# Patient Record
Sex: MALE | Race: WHITE | HISPANIC OR LATINO | Employment: FULL TIME | URBAN - METROPOLITAN AREA
[De-identification: names, ages, dates, MRNs, and addresses within clinical notes are randomized per-mention and may not be internally consistent; named-entity substitution may affect disease eponyms.]

---

## 2018-08-03 ENCOUNTER — OFFICE VISIT (OUTPATIENT)
Dept: FAMILY MEDICINE CLINIC | Facility: CLINIC | Age: 53
End: 2018-08-03
Payer: COMMERCIAL

## 2018-08-03 VITALS — WEIGHT: 145 LBS | HEART RATE: 82 BPM | RESPIRATION RATE: 16 BRPM | TEMPERATURE: 97.5 F | OXYGEN SATURATION: 97 %

## 2018-08-03 DIAGNOSIS — R21 RASH AND NONSPECIFIC SKIN ERUPTION: Primary | ICD-10-CM

## 2018-08-03 PROCEDURE — 99213 OFFICE O/P EST LOW 20 MIN: CPT | Performed by: FAMILY MEDICINE

## 2018-08-03 RX ORDER — GABAPENTIN 600 MG/1
600 TABLET ORAL 3 TIMES DAILY
Refills: 0 | COMMUNITY
Start: 2018-05-17 | End: 2022-05-23 | Stop reason: ALTCHOICE

## 2018-08-03 RX ORDER — CYCLOBENZAPRINE HCL 10 MG
10 TABLET ORAL
Refills: 0 | COMMUNITY
Start: 2018-05-17 | End: 2018-09-06

## 2018-08-03 RX ORDER — SULFAMETHOXAZOLE AND TRIMETHOPRIM 800; 160 MG/1; MG/1
1 TABLET ORAL EVERY 12 HOURS SCHEDULED
Qty: 20 TABLET | Refills: 0 | Status: SHIPPED | OUTPATIENT
Start: 2018-08-03 | End: 2018-08-13

## 2018-08-03 NOTE — PROGRESS NOTES
Assessment/Plan:    Rash and nonspecific skin eruption  Could be staph infection but cannot rule out fungal this time as under KOH  microscope be did not appreciate any hyphae  Differential includes psoriasis  prescribed Bactrim for 10 days high dose and reassess for any resolution in 2-3  Weeks  Did not prescribe steroids as I did not want to mask diagnosis  If symptoms do not resolve with antibiotics will consider sending patient for Dermatology evaluation with biopsy  Diagnoses and all orders for this visit:    Rash and nonspecific skin eruption  -     sulfamethoxazole-trimethoprim (BACTRIM DS) 800-160 mg per tablet; Take 1 tablet by mouth every 12 (twelve) hours for 10 days    Other orders  -     cyclobenzaprine (FLEXERIL) 10 mg tablet; Take 10 mg by mouth daily at bedtime  -     gabapentin (NEURONTIN) 600 MG tablet; Take 600 mg by mouth 3 (three) times a day          Subjective:      Patient ID: Denisha Gonzalez is a 47 y o  male  81-year-old male significant past medical history of of chronic rash this always in the same area of the neck and groin  Usually occurs in the summertime with his exposure to sun and sweats  Lesion is normally starts blisters and then scab over or the come dry  Lesions of the neck there associated with some pain, in the groin associated with itching  Was prescribed topical steroids in the past and relieves the symptoms as well as of prednisone  No systemic symptoms, was referred to dermatologist in the past but unsure if there was miscommunication but patient did not see a dermatologist because he never got a phone call  Rash   This is a chronic problem  Episode onset: More than 10 years  The problem has been waxing and waning since onset  The affected locations include the neck and groin (A within the posterior aspect of his neck and shoulder, and groin area)   The rash is characterized by itchiness (In the groin H is and on the shoulder neck area there reports some pain)  Associated with: Ced Maureen exposure making worse or sweating  Pertinent negatives include no anorexia, congestion, cough, diarrhea, eye pain, facial edema, fatigue, fever, joint pain, nail changes, rhinorrhea, shortness of breath, sore throat or vomiting  Past treatments include topical steroids and oral steroids  The treatment provided moderate relief  There is no history of allergies, asthma, eczema or varicella  The following portions of the patient's history were reviewed and updated as appropriate: He  has no past medical history on file  Patient Active Problem List    Diagnosis Date Noted    Rash and nonspecific skin eruption 08/03/2018     He  has no past surgical history on file  His family history is not on file  He  reports that he has never smoked  He has never used smokeless tobacco  He reports that he does not drink alcohol or use drugs  Current Outpatient Prescriptions   Medication Sig Dispense Refill    cyclobenzaprine (FLEXERIL) 10 mg tablet Take 10 mg by mouth daily at bedtime  0    gabapentin (NEURONTIN) 600 MG tablet Take 600 mg by mouth 3 (three) times a day  0    sulfamethoxazole-trimethoprim (BACTRIM DS) 800-160 mg per tablet Take 1 tablet by mouth every 12 (twelve) hours for 10 days 20 tablet 0     No current facility-administered medications for this visit  No current outpatient prescriptions on file prior to visit  No current facility-administered medications on file prior to visit  He has No Known Allergies       Review of Systems   Constitutional: Negative for activity change, appetite change, chills, diaphoresis, fatigue, fever and unexpected weight change  HENT: Negative for congestion, ear pain, facial swelling, hearing loss, nosebleeds, postnasal drip, rhinorrhea, sneezing, sore throat and trouble swallowing  Eyes: Negative for pain and redness  Respiratory: Negative for apnea, cough, choking, chest tightness and shortness of breath  Cardiovascular: Negative for chest pain, palpitations and leg swelling  Gastrointestinal: Negative for abdominal distention, abdominal pain, anorexia, blood in stool, constipation, diarrhea, nausea and vomiting  Endocrine: Negative for polydipsia, polyphagia and polyuria  Musculoskeletal: Negative for joint pain  Skin: Positive for rash  Negative for nail changes, pallor and wound  Hematological:        As described as in HPI initially starts blistering and then continues to be scabs over         Objective:      Pulse 82   Temp 97 5 °F (36 4 °C) (Temporal)   Resp 16   Wt 65 8 kg (145 lb)   SpO2 97%          Physical Exam   Constitutional: He is oriented to person, place, and time  He appears well-developed and well-nourished  No distress  HENT:   Head: Normocephalic and atraumatic  Mouth/Throat: No oropharyngeal exudate  Eyes: Conjunctivae and EOM are normal  Pupils are equal, round, and reactive to light  Right eye exhibits no discharge  Left eye exhibits no discharge  Neck: Normal range of motion  Neck supple  Cardiovascular: Normal rate, regular rhythm and normal heart sounds  Exam reveals no gallop and no friction rub  Pulmonary/Chest: Effort normal and breath sounds normal  No respiratory distress  He has no wheezes  He has no rales  He exhibits no tenderness  Abdominal: Soft  Bowel sounds are normal  He exhibits no distension  There is no tenderness  There is no rebound and no guarding  Musculoskeletal: Normal range of motion  He exhibits no edema, tenderness or deformity  Lymphadenopathy:     He has no cervical adenopathy  Neurological: He is alert and oriented to person, place, and time  No cranial nerve deficit  Skin: Skin is warm  Rash noted  He is not diaphoretic  No erythema  Psychiatric: He has a normal mood and affect   His behavior is normal  Thought content normal

## 2018-08-03 NOTE — ASSESSMENT & PLAN NOTE
Could be staph infection but cannot rule out fungal this time as under KOH  microscope be did not appreciate any hyphae  Differential includes psoriasis  prescribed Bactrim for 10 days high dose and reassess for any resolution in 2-3  Weeks  Did not prescribe steroids as I did not want to mask diagnosis  If symptoms do not resolve with antibiotics will consider sending patient for Dermatology evaluation with biopsy

## 2018-08-17 ENCOUNTER — APPOINTMENT (OUTPATIENT)
Dept: LAB | Facility: HOSPITAL | Age: 53
End: 2018-08-17
Payer: COMMERCIAL

## 2018-08-17 ENCOUNTER — OFFICE VISIT (OUTPATIENT)
Dept: FAMILY MEDICINE CLINIC | Facility: CLINIC | Age: 53
End: 2018-08-17
Payer: COMMERCIAL

## 2018-08-17 VITALS
RESPIRATION RATE: 16 BRPM | HEART RATE: 86 BPM | BODY MASS INDEX: 24.59 KG/M2 | WEIGHT: 144 LBS | TEMPERATURE: 97 F | OXYGEN SATURATION: 98 % | SYSTOLIC BLOOD PRESSURE: 110 MMHG | HEIGHT: 64 IN | DIASTOLIC BLOOD PRESSURE: 70 MMHG

## 2018-08-17 DIAGNOSIS — R31.0 HEMATURIA, GROSS: ICD-10-CM

## 2018-08-17 DIAGNOSIS — R31.0 HEMATURIA, GROSS: Primary | ICD-10-CM

## 2018-08-17 LAB
ALBUMIN SERPL BCP-MCNC: 4.1 G/DL (ref 3–5.2)
ALP SERPL-CCNC: 101 U/L (ref 43–122)
ALT SERPL W P-5'-P-CCNC: 32 U/L (ref 9–52)
ANION GAP SERPL CALCULATED.3IONS-SCNC: 6 MMOL/L (ref 5–14)
AST SERPL W P-5'-P-CCNC: 37 U/L (ref 17–59)
BACTERIA UR QL AUTO: ABNORMAL /HPF
BASOPHILS # BLD AUTO: 0 THOUSANDS/ΜL (ref 0–0.1)
BASOPHILS NFR BLD AUTO: 0 % (ref 0–1)
BILIRUB SERPL-MCNC: 0.3 MG/DL
BILIRUB UR QL STRIP: NEGATIVE
BUN SERPL-MCNC: 14 MG/DL (ref 5–25)
CALCIUM SERPL-MCNC: 9 MG/DL (ref 8.4–10.2)
CHLORIDE SERPL-SCNC: 103 MMOL/L (ref 97–108)
CLARITY UR: CLEAR
CO2 SERPL-SCNC: 31 MMOL/L (ref 22–30)
COLOR UR: YELLOW
CREAT SERPL-MCNC: 1.44 MG/DL (ref 0.7–1.5)
EOSINOPHIL # BLD AUTO: 0.1 THOUSAND/ΜL (ref 0–0.4)
EOSINOPHIL NFR BLD AUTO: 1 % (ref 0–6)
ERYTHROCYTE [DISTWIDTH] IN BLOOD BY AUTOMATED COUNT: 14.4 %
GFR SERPL CREATININE-BSD FRML MDRD: 56 ML/MIN/1.73SQ M
GLUCOSE SERPL-MCNC: 87 MG/DL (ref 70–99)
GLUCOSE UR STRIP-MCNC: NEGATIVE MG/DL
HCT VFR BLD AUTO: 44.3 % (ref 41–53)
HGB BLD-MCNC: 15.1 G/DL (ref 13.5–17.5)
HGB UR QL STRIP.AUTO: 10
KETONES UR STRIP-MCNC: NEGATIVE MG/DL
LEUKOCYTE ESTERASE UR QL STRIP: 25
LYMPHOCYTES # BLD AUTO: 2.9 THOUSANDS/ΜL (ref 0.5–4)
LYMPHOCYTES NFR BLD AUTO: 31 % (ref 20–50)
MCH RBC QN AUTO: 29.6 PG (ref 26–34)
MCHC RBC AUTO-ENTMCNC: 34 G/DL (ref 31–36)
MCV RBC AUTO: 87 FL (ref 80–100)
MONOCYTES # BLD AUTO: 0.8 THOUSAND/ΜL (ref 0.2–0.9)
MONOCYTES NFR BLD AUTO: 8 % (ref 1–10)
MUCOUS THREADS UR QL AUTO: ABNORMAL
NEUTROPHILS # BLD AUTO: 5.6 THOUSANDS/ΜL (ref 1.8–7.8)
NEUTS SEG NFR BLD AUTO: 59 % (ref 45–65)
NITRITE UR QL STRIP: NEGATIVE
NON-SQ EPI CELLS URNS QL MICRO: ABNORMAL /HPF
PH UR STRIP.AUTO: 5 [PH] (ref 4.5–8)
PLATELET # BLD AUTO: 184 THOUSANDS/UL (ref 150–450)
PMV BLD AUTO: 10 FL (ref 8.9–12.7)
POTASSIUM SERPL-SCNC: 4.1 MMOL/L (ref 3.6–5)
PROT SERPL-MCNC: 7.5 G/DL (ref 5.9–8.4)
PROT UR STRIP-MCNC: NEGATIVE MG/DL
RBC # BLD AUTO: 5.09 MILLION/UL (ref 4.5–5.9)
RBC #/AREA URNS AUTO: ABNORMAL /HPF
SL AMB  POCT GLUCOSE, UA: NORMAL
SL AMB LEUKOCYTE ESTERASE,UA: NEGATIVE
SL AMB POCT BILIRUBIN,UA: NEGATIVE
SL AMB POCT BLOOD,UA: NORMAL
SL AMB POCT CLARITY,UA: CLEAR
SL AMB POCT COLOR,UA: YELLOW
SL AMB POCT KETONES,UA: NEGATIVE
SL AMB POCT NITRITE,UA: NEGATIVE
SL AMB POCT PH,UA: 5
SL AMB POCT SPECIFIC GRAVITY,UA: 1.03
SL AMB POCT URINE PROTEIN: NORMAL
SL AMB POCT UROBILINOGEN: NORMAL
SODIUM SERPL-SCNC: 140 MMOL/L (ref 137–147)
SP GR UR STRIP.AUTO: 1.02 (ref 1–1.04)
UROBILINOGEN UA: NEGATIVE MG/DL
WBC # BLD AUTO: 9.4 THOUSAND/UL (ref 4.5–11)
WBC #/AREA URNS AUTO: ABNORMAL /HPF

## 2018-08-17 PROCEDURE — 80053 COMPREHEN METABOLIC PANEL: CPT

## 2018-08-17 PROCEDURE — 81001 URINALYSIS AUTO W/SCOPE: CPT | Performed by: STUDENT IN AN ORGANIZED HEALTH CARE EDUCATION/TRAINING PROGRAM

## 2018-08-17 PROCEDURE — 81002 URINALYSIS NONAUTO W/O SCOPE: CPT | Performed by: FAMILY MEDICINE

## 2018-08-17 PROCEDURE — 82270 OCCULT BLOOD FECES: CPT | Performed by: FAMILY MEDICINE

## 2018-08-17 PROCEDURE — 81003 URINALYSIS AUTO W/O SCOPE: CPT | Performed by: STUDENT IN AN ORGANIZED HEALTH CARE EDUCATION/TRAINING PROGRAM

## 2018-08-17 PROCEDURE — 85025 COMPLETE CBC W/AUTO DIFF WBC: CPT

## 2018-08-17 PROCEDURE — 36415 COLL VENOUS BLD VENIPUNCTURE: CPT

## 2018-08-17 PROCEDURE — 99214 OFFICE O/P EST MOD 30 MIN: CPT | Performed by: FAMILY MEDICINE

## 2018-08-17 RX ORDER — MELOXICAM 15 MG/1
15 TABLET ORAL DAILY
COMMUNITY
End: 2018-08-17 | Stop reason: SINTOL

## 2018-08-17 NOTE — PROGRESS NOTES
Assessment/Plan:    Hematuria, gross  Unknown etiology could be cystitis versus stone versus urethral injury versus bladder injury versus malignancy versus prostate disease  No alarming symptoms no night sweats no weight changes  Vitals stable, no systemic symptoms, no lightheadedness no chest pain or shortness of breath no abdominal pain or testicular pain  Two week of penile bleeding, 2nd episode, last episode 3 years ago  No history of trauma, no trauma during intercourse, patient denied any foreign objects insertion  On physical exam there is gross blood from the penis  Urine dipstick failed to show any sign infection  Had similar episode through more than 3 years ago had cystoscopy which was negative Per patient and floor  Will put in a Urology consult stat  Most likely will be seen by Urology Monday or Tuesday per [facility]   CBC and CMP stat  Urinalysis with reflex culture  Instructed patient on alarming symptoms and when to report to the emergency room patient preverbalized his  Understanding  Avoid NSAIDs and including current medication mobic   Discontinue Mobic     Rash and nonspecific skin eruption  Resolved after Bactrim       Diagnoses and all orders for this visit:    Hematuria, gross  -     CBC and differential; Future  -     Comprehensive metabolic panel; Future  -     UA w Reflex to Microscopic w Reflex to Culture  -     Ambulatory referral to Urogynecology; Future  -     POCT urine dip  -     POCT hemoccult screening  -     Urine Microscopic    Other orders  -     Discontinue: meloxicam (MOBIC) 15 mg tablet; Take 15 mg by mouth daily          Subjective:      Patient ID: Monico Murphy is a 47 y o  male  26-year-old male with no significant past medical history presents to office today with 2 weeks of penile bleeding  Patient reports seeing red spots on his underwear after taking a shower at night  Daily for 2 weeks    No other associated symptom, painless no dysuria no urgency or hesitancy no pain on defecation  No lymph node denied up with the noted by patient  Patient denies any penile discharge  Patient reports had similar systems more than 3 years ago in [place] where he was checked by urologist and did cystoscopy  Patient reports urologist that he had BPH and received no treatment  Denies any fevers chills nausea vomiting diarrhea shortness breath chest pain lightheadedness abdominal pain        The following portions of the patient's history were reviewed and updated as appropriate: He  has no past medical history on file  Patient Active Problem List    Diagnosis Date Noted    Hematuria, gross 08/17/2018    Rash and nonspecific skin eruption 08/03/2018     He  has no past surgical history on file  His family history is not on file  He  reports that he has never smoked  He has never used smokeless tobacco  He reports that he does not drink alcohol or use drugs  Current Outpatient Prescriptions   Medication Sig Dispense Refill    cyclobenzaprine (FLEXERIL) 10 mg tablet Take 10 mg by mouth daily at bedtime  0    gabapentin (NEURONTIN) 600 MG tablet Take 600 mg by mouth 3 (three) times a day  0     No current facility-administered medications for this visit  Current Outpatient Prescriptions on File Prior to Visit   Medication Sig    cyclobenzaprine (FLEXERIL) 10 mg tablet Take 10 mg by mouth daily at bedtime    gabapentin (NEURONTIN) 600 MG tablet Take 600 mg by mouth 3 (three) times a day     No current facility-administered medications on file prior to visit  He has No Known Allergies       Review of Systems   Constitutional: Negative for appetite change, chills, diaphoresis, fatigue and fever  HENT: Negative for congestion, sinus pain, sore throat and tinnitus  Eyes: Negative for photophobia, pain, redness and itching  Respiratory: Negative for apnea, choking and chest tightness      Cardiovascular: Negative for chest pain, palpitations and leg swelling  Gastrointestinal: Negative for abdominal distention, abdominal pain, anal bleeding, blood in stool, diarrhea, nausea and rectal pain  Endocrine: Negative for cold intolerance and heat intolerance  Genitourinary: Negative for decreased urine volume, difficulty urinating, discharge, dysuria, enuresis, flank pain, frequency, genital sores, hematuria, penile pain, penile swelling, scrotal swelling, testicular pain and urgency  Gross blood from the penis orphis   Note no tenderness, painless, no lesions, no discharge     Musculoskeletal: Negative for arthralgias, back pain, gait problem, joint swelling and myalgias  Skin: Negative for color change, pallor, rash and wound  Neurological: Negative for dizziness, tremors, seizures, syncope, weakness, light-headedness and numbness  Hematological: Negative for adenopathy  Does not bruise/bleed easily  Psychiatric/Behavioral: Negative for decreased concentration and dysphoric mood  Objective:      /70 (BP Location: Left arm, Patient Position: Sitting, Cuff Size: Adult)   Pulse 86   Temp (!) 97 °F (36 1 °C) (Tympanic)   Resp 16   Ht 5' 4" (1 626 m)   Wt 65 3 kg (144 lb)   SpO2 98%   BMI 24 72 kg/m²          Physical Exam   Constitutional: He is oriented to person, place, and time  HENT:   Head: Normocephalic  Eyes: Right eye exhibits no discharge  Left eye exhibits no discharge  Neck: No JVD present  No tracheal deviation present  No thyromegaly present  Cardiovascular: Normal rate, regular rhythm, normal heart sounds and intact distal pulses  Exam reveals no friction rub  No murmur heard  Pulmonary/Chest: No stridor  No respiratory distress  He has no wheezes  He has no rales  Abdominal: Soft  Bowel sounds are normal  He exhibits no distension and no mass  There is no tenderness  There is no rebound and no guarding  Genitourinary: Rectum normal and prostate normal  Rectal exam shows guaiac negative stool   No penile tenderness  Genitourinary Comments: Gross blood from the orifice of the penis on examination  Painless, no tenderness no erythema no rashes no lesions no adenopathy   Musculoskeletal: Normal range of motion  He exhibits no edema, tenderness or deformity  Lymphadenopathy:     He has no cervical adenopathy  Neurological: He is alert and oriented to person, place, and time  He has normal reflexes  No cranial nerve deficit  Coordination normal    Skin: Skin is warm and dry  No rash noted  No erythema  No pallor  Psychiatric: He has a normal mood and affect   His behavior is normal  Judgment and thought content normal

## 2018-08-17 NOTE — ASSESSMENT & PLAN NOTE
Unknown etiology could be cystitis versus stone versus urethral injury versus bladder injury versus malignancy versus prostate disease  No alarming symptoms no night sweats no weight changes  Vitals stable, no systemic symptoms, no lightheadedness no chest pain or shortness of breath no abdominal pain or testicular pain  Two week of penile bleeding, 2nd episode, last episode 3 years ago  No history of trauma, no trauma during intercourse, patient denied any foreign objects insertion  On physical exam there is gross blood from the penis  Urine dipstick failed to show any sign infection  Had similar episode through more than 3 years ago had cystoscopy which was negative Per patient and floor  Will put in a Urology consult stat    Most likely will be seen by Urology Monday or Tuesday per [facility]   CBC and CMP stat  Urinalysis with reflex culture  Instructed patient on alarming symptoms and when to report to the emergency room patient preverbalized his  Understanding  Avoid NSAIDs and including current medication mobic   Discontinue Mobic

## 2018-08-19 ENCOUNTER — TELEPHONE (OUTPATIENT)
Dept: FAMILY MEDICINE CLINIC | Facility: CLINIC | Age: 53
End: 2018-08-19

## 2018-08-19 NOTE — TELEPHONE ENCOUNTER
CALLED LABORATORY to see if there is reflex culture preliminary  The urine did not Reflex to culture as has not meet criteria protocol microscopic UA WBC not greater than 10  Called patient to check up on him, I discussed with wife and patient still the same no new changes, patient is still bleeding the same amount  Discussed with him urine blood result  Patient was unable to get an appointment with me or Dr Karina Lopez for next week  Patient has not her back on call for an appointment for urology referral, I encourage her to call the urology office, our office, and the scheduling Center to obtain a urology appointment  Discussed alarming symptoms and when to report to the emergency room or come back to our office

## 2018-08-25 DIAGNOSIS — R31.0 GROSS HEMATURIA: Primary | ICD-10-CM

## 2018-08-26 NOTE — PROGRESS NOTES
In speaking with patient's wife, she told me that her  received the wrong referral, urogynecology instead of urology  So I ordered STAT urology referral   Patient already has their phone number

## 2018-08-31 ENCOUNTER — OFFICE VISIT (OUTPATIENT)
Dept: UROLOGY | Facility: MEDICAL CENTER | Age: 53
End: 2018-08-31
Payer: COMMERCIAL

## 2018-08-31 VITALS
DIASTOLIC BLOOD PRESSURE: 60 MMHG | HEIGHT: 64 IN | WEIGHT: 146 LBS | BODY MASS INDEX: 24.92 KG/M2 | SYSTOLIC BLOOD PRESSURE: 124 MMHG

## 2018-08-31 DIAGNOSIS — R31.0 HEMATURIA, GROSS: Primary | ICD-10-CM

## 2018-08-31 DIAGNOSIS — N36.8 URETHRAL BLEEDING: ICD-10-CM

## 2018-08-31 DIAGNOSIS — N99.110 POSTPROCEDURAL MALE URETHRAL MEATAL STRICTURE: ICD-10-CM

## 2018-08-31 LAB
SL AMB  POCT GLUCOSE, UA: ABNORMAL
SL AMB LEUKOCYTE ESTERASE,UA: ABNORMAL
SL AMB POCT BILIRUBIN,UA: ABNORMAL
SL AMB POCT BLOOD,UA: ABNORMAL
SL AMB POCT CLARITY,UA: CLEAR
SL AMB POCT COLOR,UA: YELLOW
SL AMB POCT KETONES,UA: ABNORMAL
SL AMB POCT NITRITE,UA: ABNORMAL
SL AMB POCT PH,UA: 5.5
SL AMB POCT SPECIFIC GRAVITY,UA: 1.02
SL AMB POCT URINE PROTEIN: ABNORMAL
SL AMB POCT UROBILINOGEN: 1

## 2018-08-31 PROCEDURE — 81003 URINALYSIS AUTO W/O SCOPE: CPT | Performed by: UROLOGY

## 2018-08-31 PROCEDURE — 88112 CYTOPATH CELL ENHANCE TECH: CPT | Performed by: PATHOLOGY

## 2018-08-31 PROCEDURE — 87086 URINE CULTURE/COLONY COUNT: CPT | Performed by: UROLOGY

## 2018-08-31 PROCEDURE — 99244 OFF/OP CNSLTJ NEW/EST MOD 40: CPT | Performed by: UROLOGY

## 2018-08-31 RX ORDER — CIPROFLOXACIN 500 MG/1
500 TABLET, FILM COATED ORAL EVERY 12 HOURS SCHEDULED
Qty: 14 TABLET | Refills: 0 | Status: SHIPPED | OUTPATIENT
Start: 2018-08-31 | End: 2018-09-07

## 2018-08-31 NOTE — LETTER
August 31, 2018     Will MD [address]  [address] [employer]    Patient: Denisha Gonzalez   YOB: 1965   Date of Visit: 8/31/2018       Dear Dr Mirlande Sky: Thank you for referring Denisha Gonzalez to me for evaluation  Below are my notes for this consultation  If you have questions, please do not hesitate to call me  I look forward to following your patient along with you  Sincerely,        Tanna Cockayne, MD        CC: No Recipients  Tanna Cockayne, MD  8/31/2018  3:15 PM  Sign at close encounter  Assessment/Plan:      Diagnoses and all orders for this visit:    Hematuria, gross  -     POCT urine dip auto non-scope  -     Cytology, urine; Future    Postprocedural male urethral meatal stricture          Subjective:  Urethral bleeding     Patient ID: Denisha Gonzalez is a 47 y o  male  HPI  Patient's urologic history dates back to 2007 he states that he had some urethral bleeding which workup involved a cystoscopy and possible urethral dilation  Based on he and his wife cyst description he may have had a urethral stricture that was dilated at that time  He did fine since then until about 3 weeks ago very has had a recurrence of urethral bleeding and gross hematuria  He denies any dysuria, flank pains history of nephrolithiasis or UTIs  He states that he voids well without any obstructive symptoms, hesitancy, or the need to bear down or strain to generate his urinary stream     Review of Systems   Constitutional: Negative  HENT: Negative  Eyes: Negative  Respiratory: Negative  Cardiovascular: Negative  Gastrointestinal: Negative  Endocrine: Negative  Genitourinary: Positive for hematuria  Negative for dysuria, flank pain and penile pain  Musculoskeletal: Negative  Skin: Negative  Allergic/Immunologic: Negative  Neurological: Negative  Hematological: Negative  Psychiatric/Behavioral: Negative            Objective:     Physical Exam Constitutional: He is oriented to person, place, and time  He appears well-developed and well-nourished  No distress  HENT:   Head: Normocephalic and atraumatic  Nose: Nose normal    Mouth/Throat: Oropharynx is clear and moist    Eyes: Conjunctivae are normal  No scleral icterus  Neck: Normal range of motion  Neck supple  Pulmonary/Chest: Effort normal  No respiratory distress  Abdominal: Soft  Bowel sounds are normal  He exhibits no distension and no mass  There is no tenderness  Genitourinary: Circumcised  Penile erythema present  Genitourinary Comments: There was blood present at the urethral meatus, which also had a slightly stenotic appearance  Musculoskeletal: Normal range of motion  He exhibits no edema or tenderness  Neurological: He is alert and oriented to person, place, and time  No cranial nerve deficit  Skin: Skin is warm and dry  No rash noted  No erythema  No pallor  Psychiatric: He has a normal mood and affect  His behavior is normal  Judgment and thought content normal    Nursing note and vitals reviewed

## 2018-08-31 NOTE — H&P
H&P Exam - Urology   Tatianna Tay 47 y o  male MRN: [id number]    Assessment/Plan     Assessment:  Urethral meatal stricture  Gross hematuria    Plan:  Cystoscopy with urethral meatal dilation, possible laser incision of urethral stricture, bilateral retrograde pyelograms    History of Present Illness   HPI:  Tatianna Tay is a 47 y o  male who presents with a urologic history dates back to 2007 he states that he had some urethral bleeding which workup involved a cystoscopy and possible urethral dilation  Based on he and his wife cyst description he may have had a urethral stricture that was dilated at that time  He did fine since then until about 3 weeks ago very has had a recurrence of urethral bleeding and gross hematuria  He denies any dysuria, flank pains history of nephrolithiasis or UTIs  He states that he voids pretty well without any significant obstructive symptoms, hesitancy, or the need to bear down or strain to generate his urinary stream     Review of Systems   Constitutional: Negative  HENT: Negative  Eyes: Negative  Respiratory: Negative  Cardiovascular: Negative  Gastrointestinal: Negative  Endocrine: Negative  Genitourinary: Positive for hematuria  Negative for dysuria, frequency and penile pain  Musculoskeletal: Negative  Skin: Negative  Allergic/Immunologic: Negative  Neurological: Negative  Hematological: Negative  Psychiatric/Behavioral: Negative  Historical Information   Past Medical History:   Diagnosis Date    Lumbar herniated disc      History reviewed  No pertinent surgical history    Social History   History   Alcohol Use No     History   Drug Use No     History   Smoking Status    Never Smoker   Smokeless Tobacco    Never Used     Family History:   Family History   Problem Relation Age of Onset    Cancer Mother     Cancer Father        Meds/Allergies   all medications and allergies reviewed  No Known Allergies    Objective   Vitals: Blood pressure 124/60, height 5' 4" (1 626 m), weight 66 2 kg (146 lb)  Physical Exam   Constitutional: He is oriented to person, place, and time  He appears well-developed and well-nourished  No distress  HENT:   Head: Normocephalic and atraumatic  Nose: Nose normal    Mouth/Throat: Oropharynx is clear and moist    Eyes: Conjunctivae and EOM are normal  Pupils are equal, round, and reactive to light  No scleral icterus  Neck: Normal range of motion  Neck supple  Cardiovascular: Normal rate, regular rhythm, normal heart sounds and intact distal pulses  No murmur heard  Pulmonary/Chest: Effort normal and breath sounds normal  No respiratory distress  He has no wheezes  He has no rales  Abdominal: Soft  Bowel sounds are normal  He exhibits no distension and no mass  There is no tenderness  Genitourinary:   Genitourinary Comments: Gross blood per urethral meatus, with stenosis   Musculoskeletal: Normal range of motion  He exhibits no edema or tenderness  Lymphadenopathy:     He has no cervical adenopathy  Neurological: He is alert and oriented to person, place, and time  No cranial nerve deficit  Skin: Skin is warm and dry  No rash noted  No erythema  No pallor  Psychiatric: He has a normal mood and affect  His behavior is normal  Judgment and thought content normal    Nursing note and vitals reviewed  Lab Results: I have personally reviewed pertinent reports  CBC: No results found for: WBC, HGB, HCT, MCV, PLT, ADJUSTEDWBC, MCH, MCHC, RDW, MPV, NRBC  CMP: No results found for: NA, CL, CO2, ANIONGAP, BUN, CREATININE, GLUCOSE, CALCIUM, AST, ALT, ALKPHOS, PROT, ALBUMIN, BILITOT, EGFR  Urinalysis: No results found for: Cherelle Penning, SPECGRAV, PHUR, LEUKOCYTESUR, NITRITE, PROTEINUA, GLUCOSEU, KETONESU, BILIRUBINUR, BLOODU  Imaging: I have personally reviewed pertinent reports     and I have personally reviewed pertinent films in PACS  EKG, Pathology, and Other Studies: I have personally reviewed pertinent reports  and I have personally reviewed pertinent films in PACS    Counseling / Coordination of Care  Total floor / unit time spent today 40 minutes  Greater than 50% of total time was spent with the patient and / or family counseling and / or coordination of care

## 2018-08-31 NOTE — PROGRESS NOTES
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  -     ciprofloxacin (CIPRO) 500 mg tablet; Take 1 tablet (500 mg total) by mouth every 12 (twelve) hours for 7 days  -     Urine culture; Future    Urethral bleeding  -     US kidney and bladder; Future  -     ciprofloxacin (CIPRO) 500 mg tablet; Take 1 tablet (500 mg total) by mouth every 12 (twelve) hours for 7 days  -     Urine culture; Future          Subjective:  Urethral bleeding     Patient ID: Alexandra Mckenna is a 47 y o  male  HPI  Patient's urologic history dates back to 2007 he states that he had some urethral bleeding which workup involved a cystoscopy and possible urethral dilation  Based on he and his wife cyst description he may have had a urethral stricture that was dilated at that time  He did fine since then until about 3 weeks ago very has had a recurrence of urethral bleeding and gross hematuria  He denies any dysuria, flank pains history of nephrolithiasis or UTIs  He states that he voids well without any obstructive symptoms, hesitancy, or the need to bear down or strain to generate his urinary stream     Review of Systems   Constitutional: Negative  HENT: Negative  Eyes: Negative  Respiratory: Negative  Cardiovascular: Negative  Gastrointestinal: Negative  Endocrine: Negative  Genitourinary: Positive for hematuria  Negative for dysuria, flank pain and penile pain  Musculoskeletal: Negative  Skin: Negative  Allergic/Immunologic: Negative  Neurological: Negative  Hematological: Negative  Psychiatric/Behavioral: Negative            Objective:     Physical Exam   Constitutional: He is oriented to person, place, and time  He appears well-developed and well-nourished  No distress  HENT:   Head: Normocephalic and atraumatic  Nose: Nose normal    Mouth/Throat: Oropharynx is clear and moist    Eyes: Conjunctivae are normal  No scleral icterus  Neck: Normal range of motion  Neck supple  Pulmonary/Chest: Effort normal  No respiratory distress  Abdominal: Soft  Bowel sounds are normal  He exhibits no distension and no mass  There is no tenderness  Genitourinary: Circumcised  Penile erythema present  Genitourinary Comments: There was blood present at the urethral meatus, which also had a slightly stenotic appearance  Musculoskeletal: Normal range of motion  He exhibits no edema or tenderness  Neurological: He is alert and oriented to person, place, and time  No cranial nerve deficit  Skin: Skin is warm and dry  No rash noted  No erythema  No pallor  Psychiatric: He has a normal mood and affect  His behavior is normal  Judgment and thought content normal    Nursing note and vitals reviewed

## 2018-09-01 LAB — BACTERIA UR CULT: NORMAL

## 2018-09-04 PROBLEM — R31.9 HEMATURIA: Status: ACTIVE | Noted: 2018-09-04

## 2018-09-04 PROBLEM — N35.919 URETHRAL STRICTURE: Status: ACTIVE | Noted: 2018-09-04

## 2018-09-06 ENCOUNTER — TRANSCRIBE ORDERS (OUTPATIENT)
Dept: ADMINISTRATIVE | Facility: HOSPITAL | Age: 53
End: 2018-09-06

## 2018-09-06 NOTE — PRE-PROCEDURE INSTRUCTIONS
Pre-Surgery Instructions:   Medication Instructions    ciprofloxacin (CIPRO) 500 mg tablet Instructed patient per Anesthesia Guidelines   gabapentin (NEURONTIN) 600 MG tablet Instructed patient per Anesthesia Guidelines  Pre op and bathing instructions reviewed

## 2018-09-07 ENCOUNTER — HOSPITAL ENCOUNTER (OUTPATIENT)
Dept: RADIOLOGY | Facility: HOSPITAL | Age: 53
Discharge: HOME | End: 2018-09-07
Payer: COMMERCIAL

## 2018-09-07 ENCOUNTER — HOSPITAL ENCOUNTER (OUTPATIENT)
Dept: NON INVASIVE DIAGNOSTICS | Facility: HOSPITAL | Age: 53
Discharge: HOME | End: 2018-09-07
Payer: COMMERCIAL

## 2018-09-07 ENCOUNTER — APPOINTMENT (OUTPATIENT)
Dept: LAB | Facility: HOSPITAL | Age: 53
End: 2018-09-07
Payer: COMMERCIAL

## 2018-09-07 ENCOUNTER — HOSPITAL ENCOUNTER (OUTPATIENT)
Dept: ULTRASOUND IMAGING | Facility: HOSPITAL | Age: 53
Discharge: HOME | End: 2018-09-07
Payer: COMMERCIAL

## 2018-09-07 ENCOUNTER — ANESTHESIA EVENT (OUTPATIENT)
Dept: PERIOP | Facility: AMBULARY SURGERY CENTER | Age: 53
End: 2018-09-07
Payer: COMMERCIAL

## 2018-09-07 DIAGNOSIS — N36.8 URETHRAL BLEEDING: ICD-10-CM

## 2018-09-07 DIAGNOSIS — R31.9 HEMATURIA, UNSPECIFIED TYPE: ICD-10-CM

## 2018-09-07 LAB
ALBUMIN SERPL BCP-MCNC: 4.3 G/DL (ref 3–5.2)
ALP SERPL-CCNC: 95 U/L (ref 43–122)
ALT SERPL W P-5'-P-CCNC: 36 U/L (ref 9–52)
ANION GAP SERPL CALCULATED.3IONS-SCNC: 7 MMOL/L (ref 5–14)
APTT PPP: 29 SECONDS (ref 23–34)
AST SERPL W P-5'-P-CCNC: 27 U/L (ref 17–59)
ATRIAL RATE: 59 BPM
BASOPHILS # BLD AUTO: 0.1 THOUSANDS/ΜL (ref 0–0.1)
BASOPHILS NFR BLD AUTO: 1 % (ref 0–1)
BILIRUB SERPL-MCNC: 0.6 MG/DL
BUN SERPL-MCNC: 18 MG/DL (ref 5–25)
CALCIUM SERPL-MCNC: 9.3 MG/DL (ref 8.4–10.2)
CHLORIDE SERPL-SCNC: 103 MMOL/L (ref 97–108)
CO2 SERPL-SCNC: 29 MMOL/L (ref 22–30)
CREAT SERPL-MCNC: 1.36 MG/DL (ref 0.7–1.5)
EOSINOPHIL # BLD AUTO: 0.1 THOUSAND/ΜL (ref 0–0.4)
EOSINOPHIL NFR BLD AUTO: 1 % (ref 0–6)
ERYTHROCYTE [DISTWIDTH] IN BLOOD BY AUTOMATED COUNT: 13.9 %
GFR SERPL CREATININE-BSD FRML MDRD: 60 ML/MIN/1.73SQ M
GLUCOSE SERPL-MCNC: 98 MG/DL (ref 70–99)
HCT VFR BLD AUTO: 43.1 % (ref 41–53)
HGB BLD-MCNC: 14.4 G/DL (ref 13.5–17.5)
INR PPP: 1.08 (ref 0.89–1.1)
LYMPHOCYTES # BLD AUTO: 2.9 THOUSANDS/ΜL (ref 0.5–4)
LYMPHOCYTES NFR BLD AUTO: 30 % (ref 20–50)
MCH RBC QN AUTO: 28.8 PG (ref 26–34)
MCHC RBC AUTO-ENTMCNC: 33.4 G/DL (ref 31–36)
MCV RBC AUTO: 86 FL (ref 80–100)
MONOCYTES # BLD AUTO: 0.8 THOUSAND/ΜL (ref 0.2–0.9)
MONOCYTES NFR BLD AUTO: 8 % (ref 1–10)
NEUTROPHILS # BLD AUTO: 6 THOUSANDS/ΜL (ref 1.8–7.8)
NEUTS SEG NFR BLD AUTO: 61 % (ref 45–65)
P AXIS: 55 DEGREES
PLATELET # BLD AUTO: 159 THOUSANDS/UL (ref 150–450)
PMV BLD AUTO: 11 FL (ref 8.9–12.7)
POTASSIUM SERPL-SCNC: 3.8 MMOL/L (ref 3.6–5)
PR INTERVAL: 220 MS
PROT SERPL-MCNC: 7.6 G/DL (ref 5.9–8.4)
PROTHROMBIN TIME: 11.4 SECONDS (ref 9.5–11.6)
QRS AXIS: 96 DEGREES
QRSD INTERVAL: 90 MS
QT INTERVAL: 418 MS
QTC INTERVAL: 413 MS
RBC # BLD AUTO: 5 MILLION/UL (ref 4.5–5.9)
SODIUM SERPL-SCNC: 139 MMOL/L (ref 137–147)
T WAVE AXIS: 51 DEGREES
VENTRICULAR RATE: 59 BPM
WBC # BLD AUTO: 9.9 THOUSAND/UL (ref 4.5–11)

## 2018-09-07 PROCEDURE — 85025 COMPLETE CBC W/AUTO DIFF WBC: CPT

## 2018-09-07 PROCEDURE — 80053 COMPREHEN METABOLIC PANEL: CPT

## 2018-09-07 PROCEDURE — 93005 ELECTROCARDIOGRAM TRACING: CPT | Performed by: UROLOGY

## 2018-09-07 PROCEDURE — 76770 US EXAM ABDO BACK WALL COMP: CPT

## 2018-09-07 PROCEDURE — 71046 X-RAY EXAM CHEST 2 VIEWS: CPT

## 2018-09-07 PROCEDURE — 85610 PROTHROMBIN TIME: CPT

## 2018-09-07 PROCEDURE — 93010 ELECTROCARDIOGRAM REPORT: CPT | Performed by: INTERNAL MEDICINE

## 2018-09-07 PROCEDURE — 87086 URINE CULTURE/COLONY COUNT: CPT

## 2018-09-07 PROCEDURE — 36415 COLL VENOUS BLD VENIPUNCTURE: CPT

## 2018-09-07 PROCEDURE — 85730 THROMBOPLASTIN TIME PARTIAL: CPT

## 2018-09-08 LAB — BACTERIA UR CULT: NORMAL

## 2018-09-13 ENCOUNTER — ANESTHESIA (OUTPATIENT)
Dept: PERIOP | Facility: AMBULARY SURGERY CENTER | Age: 53
End: 2018-09-13
Payer: COMMERCIAL

## 2018-09-13 ENCOUNTER — APPOINTMENT (OUTPATIENT)
Dept: RADIOLOGY | Facility: AMBULARY SURGERY CENTER | Age: 53
End: 2018-09-13
Payer: COMMERCIAL

## 2018-09-13 ENCOUNTER — HOSPITAL ENCOUNTER (OUTPATIENT)
Facility: AMBULARY SURGERY CENTER | Age: 53
Setting detail: OUTPATIENT SURGERY
Discharge: HOME | End: 2018-09-13
Attending: UROLOGY | Admitting: UROLOGY
Payer: COMMERCIAL

## 2018-09-13 ENCOUNTER — TELEPHONE (OUTPATIENT)
Dept: UROLOGY | Facility: MEDICAL CENTER | Age: 53
End: 2018-09-13

## 2018-09-13 VITALS
DIASTOLIC BLOOD PRESSURE: 74 MMHG | BODY MASS INDEX: 24.07 KG/M2 | WEIGHT: 141 LBS | SYSTOLIC BLOOD PRESSURE: 132 MMHG | HEIGHT: 64 IN | TEMPERATURE: 97.3 F | RESPIRATION RATE: 18 BRPM | HEART RATE: 70 BPM | OXYGEN SATURATION: 100 %

## 2018-09-13 DIAGNOSIS — N99.110 POSTPROCEDURAL MALE URETHRAL MEATAL STRICTURE: Primary | ICD-10-CM

## 2018-09-13 DIAGNOSIS — R31.0 GROSS HEMATURIA: ICD-10-CM

## 2018-09-13 PROBLEM — N35.919 URETHRAL STRICTURE: Status: RESOLVED | Noted: 2018-09-04 | Resolved: 2018-09-13

## 2018-09-13 PROCEDURE — C1769 GUIDE WIRE: HCPCS | Performed by: UROLOGY

## 2018-09-13 PROCEDURE — 74420 UROGRAPHY RTRGR +-KUB: CPT

## 2018-09-13 PROCEDURE — 52005 CYSTO W/URTRL CATHJ: CPT | Performed by: UROLOGY

## 2018-09-13 PROCEDURE — 52276 CYSTOSCOPY AND TREATMENT: CPT | Performed by: UROLOGY

## 2018-09-13 RX ORDER — OXYCODONE HYDROCHLORIDE AND ACETAMINOPHEN 5; 325 MG/1; MG/1
1 TABLET ORAL EVERY 4 HOURS PRN
Status: DISCONTINUED | OUTPATIENT
Start: 2018-09-13 | End: 2018-09-13 | Stop reason: HOSPADM

## 2018-09-13 RX ORDER — FENTANYL CITRATE/PF 50 MCG/ML
25 SYRINGE (ML) INJECTION
Status: COMPLETED | OUTPATIENT
Start: 2018-09-13 | End: 2018-09-13

## 2018-09-13 RX ORDER — PROPOFOL 10 MG/ML
INJECTION, EMULSION INTRAVENOUS AS NEEDED
Status: DISCONTINUED | OUTPATIENT
Start: 2018-09-13 | End: 2018-09-13 | Stop reason: SURG

## 2018-09-13 RX ORDER — HYDROCODONE BITARTRATE AND ACETAMINOPHEN 5; 325 MG/1; MG/1
1 TABLET ORAL EVERY 6 HOURS PRN
Qty: 20 TABLET | Refills: 0 | Status: SHIPPED | OUTPATIENT
Start: 2018-09-13 | End: 2018-09-23

## 2018-09-13 RX ORDER — SODIUM CHLORIDE 9 MG/ML
125 INJECTION, SOLUTION INTRAVENOUS CONTINUOUS
Status: DISCONTINUED | OUTPATIENT
Start: 2018-09-13 | End: 2018-09-13 | Stop reason: HOSPADM

## 2018-09-13 RX ORDER — ONDANSETRON 2 MG/ML
4 INJECTION INTRAMUSCULAR; INTRAVENOUS ONCE AS NEEDED
Status: DISCONTINUED | OUTPATIENT
Start: 2018-09-13 | End: 2018-09-13 | Stop reason: HOSPADM

## 2018-09-13 RX ORDER — MAGNESIUM HYDROXIDE 1200 MG/15ML
LIQUID ORAL AS NEEDED
Status: DISCONTINUED | OUTPATIENT
Start: 2018-09-13 | End: 2018-09-13 | Stop reason: HOSPADM

## 2018-09-13 RX ORDER — ONDANSETRON 2 MG/ML
INJECTION INTRAMUSCULAR; INTRAVENOUS AS NEEDED
Status: DISCONTINUED | OUTPATIENT
Start: 2018-09-13 | End: 2018-09-13 | Stop reason: SURG

## 2018-09-13 RX ORDER — CIPROFLOXACIN 250 MG/1
250 TABLET, FILM COATED ORAL EVERY 12 HOURS SCHEDULED
Qty: 10 TABLET | Refills: 0 | Status: SHIPPED | OUTPATIENT
Start: 2018-09-13 | End: 2018-09-18

## 2018-09-13 RX ORDER — MIDAZOLAM HYDROCHLORIDE 1 MG/ML
INJECTION INTRAMUSCULAR; INTRAVENOUS AS NEEDED
Status: DISCONTINUED | OUTPATIENT
Start: 2018-09-13 | End: 2018-09-13 | Stop reason: SURG

## 2018-09-13 RX ORDER — ACETAMINOPHEN 325 MG/1
650 TABLET ORAL EVERY 6 HOURS PRN
Status: DISCONTINUED | OUTPATIENT
Start: 2018-09-13 | End: 2018-09-13 | Stop reason: HOSPADM

## 2018-09-13 RX ORDER — LIDOCAINE HYDROCHLORIDE 10 MG/ML
INJECTION, SOLUTION INFILTRATION; PERINEURAL AS NEEDED
Status: DISCONTINUED | OUTPATIENT
Start: 2018-09-13 | End: 2018-09-13 | Stop reason: SURG

## 2018-09-13 RX ORDER — FINASTERIDE 5 MG/1
5 TABLET, FILM COATED ORAL DAILY
Qty: 90 TABLET | Refills: 3 | Status: SHIPPED | OUTPATIENT
Start: 2018-09-13 | End: 2022-05-23 | Stop reason: ALTCHOICE

## 2018-09-13 RX ORDER — SUCCINYLCHOLINE/SOD CL,ISO/PF 100 MG/5ML
SYRINGE (ML) INTRAVENOUS AS NEEDED
Status: DISCONTINUED | OUTPATIENT
Start: 2018-09-13 | End: 2018-09-13 | Stop reason: SURG

## 2018-09-13 RX ORDER — FENTANYL CITRATE 50 UG/ML
INJECTION, SOLUTION INTRAMUSCULAR; INTRAVENOUS AS NEEDED
Status: DISCONTINUED | OUTPATIENT
Start: 2018-09-13 | End: 2018-09-13 | Stop reason: SURG

## 2018-09-13 RX ADMIN — FENTANYL CITRATE 50 MCG: 50 INJECTION, SOLUTION INTRAMUSCULAR; INTRAVENOUS at 12:18

## 2018-09-13 RX ADMIN — LIDOCAINE HYDROCHLORIDE 50 MG: 10 INJECTION, SOLUTION INFILTRATION; PERINEURAL at 11:57

## 2018-09-13 RX ADMIN — PROPOFOL 200 MG: 10 INJECTION, EMULSION INTRAVENOUS at 11:57

## 2018-09-13 RX ADMIN — DEXAMETHASONE SODIUM PHOSPHATE 4 MG: 10 INJECTION INTRAMUSCULAR; INTRAVENOUS at 12:05

## 2018-09-13 RX ADMIN — ONDANSETRON 4 MG: 2 INJECTION INTRAMUSCULAR; INTRAVENOUS at 12:16

## 2018-09-13 RX ADMIN — FENTANYL CITRATE 25 MCG: 50 INJECTION INTRAMUSCULAR; INTRAVENOUS at 12:52

## 2018-09-13 RX ADMIN — SODIUM CHLORIDE: 0.9 INJECTION, SOLUTION INTRAVENOUS at 11:55

## 2018-09-13 RX ADMIN — CEFAZOLIN SODIUM 2000 MG: 2 SOLUTION INTRAVENOUS at 12:01

## 2018-09-13 RX ADMIN — FENTANYL CITRATE 50 MCG: 50 INJECTION, SOLUTION INTRAMUSCULAR; INTRAVENOUS at 12:02

## 2018-09-13 RX ADMIN — FENTANYL CITRATE 25 MCG: 50 INJECTION INTRAMUSCULAR; INTRAVENOUS at 12:44

## 2018-09-13 RX ADMIN — MIDAZOLAM HYDROCHLORIDE 2 MG: 1 INJECTION, SOLUTION INTRAMUSCULAR; INTRAVENOUS at 11:57

## 2018-09-13 RX ADMIN — Medication 20 MG: at 11:59

## 2018-09-13 NOTE — TELEPHONE ENCOUNTER
Left message for patient's wife; She is to call back to have her 's Mejia removal scheduled  *Dr Sarah Robledo post-op note dictates he is to return 4 days after his surgery to have it removed  There are no openings in the schedule that morning  It should be scheduled  For 09/18/2018  *

## 2018-09-13 NOTE — ANESTHESIA PREPROCEDURE EVALUATION
Review of Systems/Medical History  Patient summary reviewed  Chart reviewed  No history of anesthetic complications     Cardiovascular  Negative cardio ROS    Pulmonary  Negative pulmonary ROS        GI/Hepatic  Negative GI/hepatic ROS            Comment: Ureteral stricture     Endo/Other  Negative endo/other ROS      GYN       Hematology  Negative hematology ROS      Musculoskeletal  Back pain , lumbar pain,        Neurology  Negative neurology ROS      Psychology   Negative psychology ROS              Physical Exam    Airway    Mallampati score: II  TM Distance: >3 FB  Neck ROM: full     Dental   No notable dental hx     Cardiovascular  Comment: Negative ROS, Rhythm: regular, Rate: normal, Cardiovascular exam normal    Pulmonary  Pulmonary exam normal Breath sounds clear to auscultation,     Other Findings        Anesthesia Plan  ASA Score- 2     Anesthesia Type- general with ASA Monitors  Additional Monitors:   Airway Plan: LMA  Plan Factors-    Induction- intravenous  Postoperative Plan- Plan for postoperative opioid use  Informed Consent- Anesthetic plan and risks discussed with patient and spouse  I personally reviewed this patient with the CRNA  Discussed and agreed on the Anesthesia Plan with the CRNA  Layne Gallegos Recent labs personally reviewed:  Lab Results   Component Value Date    WBC 9 90 09/07/2018    HGB 14 4 09/07/2018     09/07/2018     Lab Results   Component Value Date     09/07/2018    K 3 8 09/07/2018    BUN 18 09/07/2018    CREATININE 1 36 09/07/2018     Lab Results   Component Value Date    PTT 29 09/07/2018      Lab Results   Component Value Date    INR 1 08 09/07/2018     I, Vaughn Madrigal MD, have personally seen and evaluated the patient prior to anesthetic care  I have reviewed the pre-anesthetic record, and other medical records if appropriate to the anesthetic care    If a CRNA is involved in the case, I have reviewed the CRNA assessment, if present, and agree  Risks/benefits and alternatives discussed with patient including possible PONV, sore throat, and possibility of rare anesthetic and surgical emergencies

## 2018-09-13 NOTE — OP NOTE
OPERATIVE REPORT  PATIENT NAME: Denisha Gonzalez    :  1965  MRN: [id number]  Pt Location: [facility] OR ROOM 05    SURGERY DATE: 2018    Surgeon(s) and Role:     * Tanna Cockayne, MD - Primary    Preop Diagnosis:  Urethral stricture, unspecified stricture type [N35 9]  Hematuria, unspecified type [R31 9]    Post-Op Diagnosis Codes:     * Urethral stricture, unspecified stricture type [N35 9]     * Hematuria, unspecified type [R31 9]    Procedure(s) (LRB):  URETHRAL DILATION WITH MALE SOUNDS  CYSTOSCOPY WITH RETROGRADE PYELOGRAM (Bilateral)  DIRECT VISUAL INTERAL URETHROTOMY (DVIU),       Estimated Blood Loss:   Minimal    Drains:  Urethral Catheter Latex; Other (Comment) 18 Fr  (Active)   Site Assessment Clean;Skin intact 2018 12:22 PM   Collection Container Standard drainage bag 2018 12:22 PM   Number of days: 0       Anesthesia Type:   General    Operative Indications:  Urethral stricture, unspecified stricture type [N35 9]  Hematuria, unspecified type [R31 9]    Operative Findings:  BPH, WITH HEMORRHAGIC INJECTED PROSTATIC URETHRAL MUCOSA    Complications:   None    Procedure and Technique:  the patient was brought to the operating room and positively identified as Denisha Gonzalez  Patient was place in lithotomy  general was induced  He was then moved into a lithotomy position  The groin and lower abdomen was prepped and draped in standard, sterile fashion  An appropriate time out was conducted  Pre-op operative IV antibiotics were given prior to anesthesia  The patient's tight meatal urethral stricture was 1st dilated to from 15 [place] to 27 [place] with male sounds  The DVI urethra tome was then placed to snip open any residual strictures  Urethra scope the was performed with a 30 degree lens, and there was no other evidence of urethral obstruction, stricture, or lesions seen  The prostatic urethra was obstructive in appearance with trilobar hypertrophy    The prostatic urethra mucosa was consistent with the BPH type moderately injected mucosa with some oozing blood, as a possible source of his gross hematuria  The bladder was then entered, and cystoscopy was then performed with a 22 F cystoscope utilizing the 30 and 70 degree lenses  There were no tumors, stones, or diverticuli seen in the bladder  There was 1/4 trabeculation Both ureteral orifices were visualized and 5 F cone-tipped catheters were placed at the ureteral orifices  A retrograde pyelogram on each side demonstrated normal pelvicaliceal systems bilaterally  The cystoscope was removed and then a 18 Fr brody was then placed and the balloon filled to 10cc  He was brought of general anesthesia, having tolerated the procedure well, and taken to the recovery room in stable condition      Patient Disposition:  PACU     SIGNATURE: Winifred Sorto MD  DATE: September 13, 2018  TIME: 12:31 PM

## 2018-09-13 NOTE — H&P (VIEW-ONLY)
H&P Exam - Urology   Simone Estrella 47 y o  male MRN: [id number]    Assessment/Plan     Assessment:  Urethral meatal stricture  Gross hematuria    Plan:  Cystoscopy with urethral meatal dilation, possible laser incision of urethral stricture, bilateral retrograde pyelograms    History of Present Illness   HPI:  Simone Estrella is a 47 y o  male who presents with a urologic history dates back to 2007 he states that he had some urethral bleeding which workup involved a cystoscopy and possible urethral dilation  Based on he and his wife cyst description he may have had a urethral stricture that was dilated at that time  He did fine since then until about 3 weeks ago very has had a recurrence of urethral bleeding and gross hematuria  He denies any dysuria, flank pains history of nephrolithiasis or UTIs  He states that he voids pretty well without any significant obstructive symptoms, hesitancy, or the need to bear down or strain to generate his urinary stream     Review of Systems   Constitutional: Negative  HENT: Negative  Eyes: Negative  Respiratory: Negative  Cardiovascular: Negative  Gastrointestinal: Negative  Endocrine: Negative  Genitourinary: Positive for hematuria  Negative for dysuria, frequency and penile pain  Musculoskeletal: Negative  Skin: Negative  Allergic/Immunologic: Negative  Neurological: Negative  Hematological: Negative  Psychiatric/Behavioral: Negative  Historical Information   Past Medical History:   Diagnosis Date    Lumbar herniated disc      History reviewed  No pertinent surgical history    Social History   History   Alcohol Use No     History   Drug Use No     History   Smoking Status    Never Smoker   Smokeless Tobacco    Never Used     Family History:   Family History   Problem Relation Age of Onset    Cancer Mother     Cancer Father        Meds/Allergies   all medications and allergies reviewed  No Known Allergies    Objective   Vitals: Blood pressure 124/60, height 5' 4" (1 626 m), weight 66 2 kg (146 lb)  Physical Exam   Constitutional: He is oriented to person, place, and time  He appears well-developed and well-nourished  No distress  HENT:   Head: Normocephalic and atraumatic  Nose: Nose normal    Mouth/Throat: Oropharynx is clear and moist    Eyes: Conjunctivae and EOM are normal  Pupils are equal, round, and reactive to light  No scleral icterus  Neck: Normal range of motion  Neck supple  Cardiovascular: Normal rate, regular rhythm, normal heart sounds and intact distal pulses  No murmur heard  Pulmonary/Chest: Effort normal and breath sounds normal  No respiratory distress  He has no wheezes  He has no rales  Abdominal: Soft  Bowel sounds are normal  He exhibits no distension and no mass  There is no tenderness  Genitourinary:   Genitourinary Comments: Gross blood per urethral meatus, with stenosis   Musculoskeletal: Normal range of motion  He exhibits no edema or tenderness  Lymphadenopathy:     He has no cervical adenopathy  Neurological: He is alert and oriented to person, place, and time  No cranial nerve deficit  Skin: Skin is warm and dry  No rash noted  No erythema  No pallor  Psychiatric: He has a normal mood and affect  His behavior is normal  Judgment and thought content normal    Nursing note and vitals reviewed  Lab Results: I have personally reviewed pertinent reports  CBC: No results found for: WBC, HGB, HCT, MCV, PLT, ADJUSTEDWBC, MCH, MCHC, RDW, MPV, NRBC  CMP: No results found for: NA, CL, CO2, ANIONGAP, BUN, CREATININE, GLUCOSE, CALCIUM, AST, ALT, ALKPHOS, PROT, ALBUMIN, BILITOT, EGFR  Urinalysis: No results found for: Kaycee Ink, SPECGRAV, PHUR, LEUKOCYTESUR, NITRITE, PROTEINUA, GLUCOSEU, KETONESU, BILIRUBINUR, BLOODU  Imaging: I have personally reviewed pertinent reports     and I have personally reviewed pertinent films in PACS  EKG, Pathology, and Other Studies: I have personally reviewed pertinent reports  and I have personally reviewed pertinent films in PACS    Counseling / Coordination of Care  Total floor / unit time spent today 40 minutes  Greater than 50% of total time was spent with the patient and / or family counseling and / or coordination of care

## 2018-09-13 NOTE — ANESTHESIA POSTPROCEDURE EVALUATION
Post-Op Assessment Note      CV Status:  Stable    Mental Status:  Alert and awake    Hydration Status:  Euvolemic    PONV Controlled:  Controlled    Airway Patency:  Patent    Post Op Vitals Reviewed: Yes          Staff: CRNA           /74 (09/13/18 1330)    Temp (!) 97 3 °F (36 3 °C) (09/13/18 1330)    Pulse 70 (09/13/18 1330)   Resp 18 (09/13/18 1330)    SpO2 100 % (09/13/18 1330)

## 2018-09-18 ENCOUNTER — CLINICAL SUPPORT (OUTPATIENT)
Dept: UROLOGY | Facility: MEDICAL CENTER | Age: 53
End: 2018-09-18

## 2018-09-18 VITALS — BODY MASS INDEX: 24.07 KG/M2 | WEIGHT: 141 LBS | HEIGHT: 64 IN | RESPIRATION RATE: 18 BRPM | TEMPERATURE: 98 F

## 2018-09-18 DIAGNOSIS — IMO0002 OTHER SPECIFIED CAUSES OF URETHRAL STRICTURE: Primary | ICD-10-CM

## 2018-09-18 PROCEDURE — 99024 POSTOP FOLLOW-UP VISIT: CPT

## 2018-09-18 NOTE — PROGRESS NOTES
Patient po urethral stricture ,patient doing well  Urine is clear and brody was removed without problems  He was instructed to drink plenty of fluids  Patient to call if any problems  He will see Dr Fiorella Carmichael in 4 weeks and he may go back to work Thursday or Friday per Dr Fiorella Carmichael

## 2018-10-19 ENCOUNTER — OFFICE VISIT (OUTPATIENT)
Dept: UROLOGY | Facility: MEDICAL CENTER | Age: 53
End: 2018-10-19
Payer: COMMERCIAL

## 2018-10-19 VITALS
BODY MASS INDEX: 24.07 KG/M2 | HEIGHT: 64 IN | SYSTOLIC BLOOD PRESSURE: 120 MMHG | DIASTOLIC BLOOD PRESSURE: 82 MMHG | WEIGHT: 141 LBS

## 2018-10-19 DIAGNOSIS — N52.8 OTHER MALE ERECTILE DYSFUNCTION: ICD-10-CM

## 2018-10-19 DIAGNOSIS — Z87.448 HISTORY OF URETHRAL STRICTURE: Primary | ICD-10-CM

## 2018-10-19 DIAGNOSIS — N99.110 POSTPROCEDURAL URETHRAL STRICTURE, MALE, MEATAL: ICD-10-CM

## 2018-10-19 PROCEDURE — 99214 OFFICE O/P EST MOD 30 MIN: CPT | Performed by: UROLOGY

## 2018-10-19 RX ORDER — MELOXICAM 15 MG/1
TABLET ORAL
Refills: 0 | COMMUNITY
Start: 2018-10-15 | End: 2022-05-23 | Stop reason: ALTCHOICE

## 2018-10-19 RX ORDER — CYCLOBENZAPRINE HCL 10 MG
10 TABLET ORAL
Refills: 0 | COMMUNITY
Start: 2018-10-15 | End: 2022-05-23 | Stop reason: ALTCHOICE

## 2018-10-19 RX ORDER — TADALAFIL 20 MG/1
10 TABLET ORAL DAILY PRN
Qty: 5 TABLET | Refills: 3 | Status: SHIPPED | OUTPATIENT
Start: 2018-10-19 | End: 2021-12-08 | Stop reason: SDUPTHER

## 2018-10-19 NOTE — PROGRESS NOTES
Assessment/Plan:      Diagnoses and all orders for this visit:    History of urethral stricture    Postprocedural urethral stricture, male, meatal  -     Cystoscopy; Future    Other male erectile dysfunction  -     tadalafil (CIALIS) 20 MG tablet; Take 0 5 tablets (10 mg total) by mouth daily as needed for erectile dysfunction    Other orders  -     cyclobenzaprine (FLEXERIL) 10 mg tablet; Take 10 mg by mouth daily at bedtime  -     meloxicam (MOBIC) 15 mg tablet; take 1 tablet by mouth once daily after meals          Subjective:  Postop incision urethral stricture     Patient ID: Patricia Walton is a 47 y o  male  HPI  Urologic history dates back to 2007 he states that he had some urethral bleeding which workup involved a cystoscopy and possible urethral dilation  Based on he and his wife cyst description he may have had a urethral stricture that was dilated at that time  He is now month after a repeat cystoscopy and laser incision of urethral stricture by me  He states that he voids well without any significant obstructive symptoms, hesitancy, or the need to bear down or strain to generate his urinary stream   He denies any dysuria, flank pains history of nephrolithiasis or UTIs  He and his wife also report that he has erectile dysfunction, with difficulty maintaining his erection, that preceded my recent treatments of his urethral stricture disease  They are interested in some form of medication remedy  Review of Systems   Constitutional: Negative  HENT: Negative  Eyes: Negative  Respiratory: Negative  Cardiovascular: Negative  Gastrointestinal: Negative  Endocrine: Negative  Genitourinary: Negative  Negative for difficulty urinating, dysuria and hematuria  Musculoskeletal: Negative  Skin: Negative  Allergic/Immunologic: Negative  Neurological: Negative  Hematological: Negative  Psychiatric/Behavioral: Negative            Objective:     Physical Exam Constitutional: He is oriented to person, place, and time  He appears well-developed and well-nourished  No distress  HENT:   Head: Normocephalic and atraumatic  Nose: Nose normal    Mouth/Throat: Oropharynx is clear and moist    Eyes: Pupils are equal, round, and reactive to light  Conjunctivae and EOM are normal  No scleral icterus  Neck: Normal range of motion  Neck supple  Cardiovascular: Normal rate, regular rhythm, normal heart sounds and intact distal pulses  No murmur heard  Pulmonary/Chest: Effort normal and breath sounds normal  No respiratory distress  He has no wheezes  He has no rales  Abdominal: Soft  Bowel sounds are normal  He exhibits no distension and no mass  There is no tenderness  Musculoskeletal: Normal range of motion  He exhibits no edema or tenderness  Lymphadenopathy:     He has no cervical adenopathy  Neurological: He is alert and oriented to person, place, and time  No cranial nerve deficit  Skin: Skin is warm and dry  No rash noted  No erythema  No pallor  Psychiatric: He has a normal mood and affect  His behavior is normal  Judgment and thought content normal    Nursing note and vitals reviewed

## 2018-10-19 NOTE — LETTER
October 19, 2018     MD Suly Hector[address]    Patient: Mary Nugent   YOB: 1965   Date of Visit: 10/19/2018       Dear Dr Luiz Barroso: Thank you for referring Mary Nugent to me for evaluation  Below are my notes for this consultation  If you have questions, please do not hesitate to call me  I look forward to following your patient along with you  Sincerely,        Trent Webber MD        CC: No Recipients  Trent Webber MD  10/19/2018  2:43 PM  Sign at close encounter  Assessment/Plan:      Diagnoses and all orders for this visit:    History of urethral stricture    Postprocedural urethral stricture, male, meatal  -     Cystoscopy; Future    Other male erectile dysfunction  -     tadalafil (CIALIS) 20 MG tablet; Take 0 5 tablets (10 mg total) by mouth daily as needed for erectile dysfunction    Other orders  -     cyclobenzaprine (FLEXERIL) 10 mg tablet; Take 10 mg by mouth daily at bedtime  -     meloxicam (MOBIC) 15 mg tablet; take 1 tablet by mouth once daily after meals          Subjective:  Postop incision urethral stricture     Patient ID: Mary Nugent is a 47 y o  male  HPI  Urologic history dates back to 2007 he states that he had some urethral bleeding which workup involved a cystoscopy and possible urethral dilation  Based on he and his wife cyst description he may have had a urethral stricture that was dilated at that time  He is now month after a repeat cystoscopy and laser incision of urethral stricture by me  He states that he voids well without any significant obstructive symptoms, hesitancy, or the need to bear down or strain to generate his urinary stream   He denies any dysuria, flank pains history of nephrolithiasis or UTIs  He and his wife also report that he has erectile dysfunction, with difficulty maintaining his erection, that preceded my recent treatments of his urethral stricture disease    They are interested in some form of medication remedy  Review of Systems   Constitutional: Negative  HENT: Negative  Eyes: Negative  Respiratory: Negative  Cardiovascular: Negative  Gastrointestinal: Negative  Endocrine: Negative  Genitourinary: Negative  Negative for difficulty urinating, dysuria and hematuria  Musculoskeletal: Negative  Skin: Negative  Allergic/Immunologic: Negative  Neurological: Negative  Hematological: Negative  Psychiatric/Behavioral: Negative  Objective:     Physical Exam   Constitutional: He is oriented to person, place, and time  He appears well-developed and well-nourished  No distress  HENT:   Head: Normocephalic and atraumatic  Nose: Nose normal    Mouth/Throat: Oropharynx is clear and moist    Eyes: Pupils are equal, round, and reactive to light  Conjunctivae and EOM are normal  No scleral icterus  Neck: Normal range of motion  Neck supple  Cardiovascular: Normal rate, regular rhythm, normal heart sounds and intact distal pulses  No murmur heard  Pulmonary/Chest: Effort normal and breath sounds normal  No respiratory distress  He has no wheezes  He has no rales  Abdominal: Soft  Bowel sounds are normal  He exhibits no distension and no mass  There is no tenderness  Musculoskeletal: Normal range of motion  He exhibits no edema or tenderness  Lymphadenopathy:     He has no cervical adenopathy  Neurological: He is alert and oriented to person, place, and time  No cranial nerve deficit  Skin: Skin is warm and dry  No rash noted  No erythema  No pallor  Psychiatric: He has a normal mood and affect  His behavior is normal  Judgment and thought content normal    Nursing note and vitals reviewed

## 2019-03-20 ENCOUNTER — TELEPHONE (OUTPATIENT)
Dept: UROLOGY | Facility: MEDICAL CENTER | Age: 54
End: 2019-03-20

## 2019-03-20 NOTE — TELEPHONE ENCOUNTER
Spoke with pt wife, she said, he does not have any blood in urine it's in his pants when he wakes up or after work  He does not have any pain no fevers or chills  Told her I would forward to Dr Manpreet Forrester, since he had seen patient for this in the past and see when he would like him to come in

## 2019-03-20 NOTE — TELEPHONE ENCOUNTER
Wife called to schedule appointment since patient has blood in his urine  I was not able to get an appointment with Dr Hudson Joseph until 03/29/19  Please advise if patient should wait until then

## 2019-03-29 ENCOUNTER — OFFICE VISIT (OUTPATIENT)
Dept: UROLOGY | Facility: MEDICAL CENTER | Age: 54
End: 2019-03-29
Payer: COMMERCIAL

## 2019-03-29 ENCOUNTER — APPOINTMENT (OUTPATIENT)
Dept: LAB | Facility: HOSPITAL | Age: 54
End: 2019-03-29
Payer: COMMERCIAL

## 2019-03-29 VITALS
BODY MASS INDEX: 24.92 KG/M2 | WEIGHT: 146 LBS | HEIGHT: 64 IN | HEART RATE: 68 BPM | DIASTOLIC BLOOD PRESSURE: 70 MMHG | SYSTOLIC BLOOD PRESSURE: 120 MMHG

## 2019-03-29 DIAGNOSIS — N40.0 ENLARGED PROSTATE: ICD-10-CM

## 2019-03-29 DIAGNOSIS — R35.0 FREQUENCY OF URINATION: ICD-10-CM

## 2019-03-29 DIAGNOSIS — N99.110 POSTPROCEDURAL URETHRAL STRICTURE, MALE, MEATAL: Primary | ICD-10-CM

## 2019-03-29 DIAGNOSIS — N52.8 OTHER MALE ERECTILE DYSFUNCTION: ICD-10-CM

## 2019-03-29 LAB
POST-VOID RESIDUAL VOLUME, ML POC: 0 ML
PSA SERPL-MCNC: 1.5 NG/ML (ref 0–4)
SL AMB  POCT GLUCOSE, UA: ABNORMAL
SL AMB LEUKOCYTE ESTERASE,UA: ABNORMAL
SL AMB POCT BILIRUBIN,UA: ABNORMAL
SL AMB POCT BLOOD,UA: ABNORMAL
SL AMB POCT CLARITY,UA: CLEAR
SL AMB POCT COLOR,UA: YELLOW
SL AMB POCT KETONES,UA: ABNORMAL
SL AMB POCT NITRITE,UA: ABNORMAL
SL AMB POCT PH,UA: 6.5
SL AMB POCT SPECIFIC GRAVITY,UA: 1.02
SL AMB POCT URINE PROTEIN: ABNORMAL
SL AMB POCT UROBILINOGEN: 1

## 2019-03-29 PROCEDURE — 99214 OFFICE O/P EST MOD 30 MIN: CPT | Performed by: UROLOGY

## 2019-03-29 PROCEDURE — 81003 URINALYSIS AUTO W/O SCOPE: CPT | Performed by: UROLOGY

## 2019-03-29 PROCEDURE — 84153 ASSAY OF PSA TOTAL: CPT

## 2019-03-29 PROCEDURE — 51798 US URINE CAPACITY MEASURE: CPT | Performed by: UROLOGY

## 2019-03-29 RX ORDER — OXYBUTYNIN CHLORIDE 5 MG/1
5 TABLET ORAL 2 TIMES DAILY
Qty: 180 TABLET | Refills: 3 | Status: SHIPPED | OUTPATIENT
Start: 2019-03-29 | End: 2022-05-23 | Stop reason: ALTCHOICE

## 2019-03-29 NOTE — PROGRESS NOTES
Assessment/Plan:      Diagnoses and all orders for this visit:    Postprocedural urethral stricture, male, meatal  -     POCT urine dip auto non-scope  -     Cystoscopy; Future    Frequency of urination  -     POCT urine dip auto non-scope  -     POCT Measure PVR  -     oxybutynin (DITROPAN) 5 mg tablet; Take 1 tablet (5 mg total) by mouth 2 (two) times a day    Enlarged prostate  -     PSA Total, Diagnostic; Future    Other male erectile dysfunction          Subjective:  No complaints     Patient ID: Parviz Fay is a 47 y o  male  HPI  Urologic history dates back to 2007 he states that he had some urethral bleeding which workup involved a cystoscopy and possible urethral dilation   Based on he and his wife cyst description he may have had a urethral stricture that was dilated at that time        He is now 6 months after a repeat cystoscopy and laser incision of urethral stricture by me   He states that he voids well with a good flow, however he is having daytime frequency and some urge incontinence  He denies any dysuria, flank pains history of nephrolithiasis or UTIs         Review of Systems   Constitutional: Negative  HENT: Negative  Eyes: Negative  Respiratory: Negative  Cardiovascular: Negative  Gastrointestinal: Negative  Endocrine: Negative  Genitourinary: Negative  Negative for difficulty urinating  Musculoskeletal: Negative  Skin: Negative  Allergic/Immunologic: Negative  Neurological: Negative  Hematological: Negative  Psychiatric/Behavioral: Negative  Objective:     Physical Exam   Constitutional: He is oriented to person, place, and time  He appears well-developed and well-nourished  No distress  HENT:   Head: Normocephalic and atraumatic  Nose: Nose normal    Mouth/Throat: Oropharynx is clear and moist    Eyes: Pupils are equal, round, and reactive to light  Conjunctivae and EOM are normal  No scleral icterus  Neck: Normal range of motion   Neck supple  Cardiovascular: Normal rate, regular rhythm, normal heart sounds and intact distal pulses  No murmur heard  Pulmonary/Chest: Effort normal and breath sounds normal  No respiratory distress  He has no wheezes  He has no rales  Abdominal: Soft  Bowel sounds are normal  He exhibits no distension and no mass  There is no tenderness  Musculoskeletal: Normal range of motion  He exhibits no edema or tenderness  Lymphadenopathy:     He has no cervical adenopathy  Neurological: He is alert and oriented to person, place, and time  No cranial nerve deficit  Skin: Skin is warm and dry  No rash noted  No erythema  No pallor  Psychiatric: He has a normal mood and affect  His behavior is normal  Judgment and thought content normal    Nursing note and vitals reviewed        PVR today was 0 cc

## 2019-12-31 DIAGNOSIS — Z78.9 NEED FOR FOLLOW-UP BY SOCIAL WORKER: Primary | ICD-10-CM

## 2020-01-23 ENCOUNTER — PATIENT OUTREACH (OUTPATIENT)
Dept: FAMILY MEDICINE CLINIC | Facility: CLINIC | Age: 55
End: 2020-01-23

## 2020-01-23 NOTE — LETTER
[address]  [phone]    Re: Care Coordination   1/29/2020       Dear Artemio Lazcano,    I tried to reach you by phone on 1/23/20, 1/27/20, and 1/29/20 and was unfortunately unable to reach you  It is important that you contact the [place] [id number] as soon as possible       Sincerely,         EDE Valentin, [facility]   Care Manager  [phone]

## 2020-01-29 NOTE — PROGRESS NOTES
JOEY GERMAIN referral from [address] [place] [address] Staff for two consecutive no shows  Three outreach attempts by JOEY GERMAIN and pt has not returned any of these calls  Unable to reach letter sent to pt  JOEY GERMAIN is unable to assess for any social barriers to appt attendance at this time  JOEY GERMAIN is closing referral, but remains available for additional support as needed via order

## 2020-08-06 ENCOUNTER — TELEMEDICINE (OUTPATIENT)
Dept: FAMILY MEDICINE CLINIC | Facility: CLINIC | Age: 55
End: 2020-08-06

## 2020-08-06 DIAGNOSIS — Z20.828 EXPOSURE TO SARS-ASSOCIATED CORONAVIRUS: Primary | ICD-10-CM

## 2020-08-06 DIAGNOSIS — Z20.828 EXPOSURE TO SARS-ASSOCIATED CORONAVIRUS: ICD-10-CM

## 2020-08-06 PROCEDURE — 99213 OFFICE O/P EST LOW 20 MIN: CPT | Performed by: FAMILY MEDICINE

## 2020-08-06 PROCEDURE — U0003 INFECTIOUS AGENT DETECTION BY NUCLEIC ACID (DNA OR RNA); SEVERE ACUTE RESPIRATORY SYNDROME CORONAVIRUS 2 (SARS-COV-2) (CORONAVIRUS DISEASE [COVID-19]), AMPLIFIED PROBE TECHNIQUE, MAKING USE OF HIGH THROUGHPUT TECHNOLOGIES AS DESCRIBED BY CMS-2020-01-R: HCPCS | Performed by: FAMILY MEDICINE

## 2020-08-06 NOTE — ASSESSMENT & PLAN NOTE
I have recommended using her mask while in common areas in the household  Frequent hand washing and cleaning of commonly used surfaces has also been recommended by the patient and all family members  ER precautions have been given in the event he develops SOB, chest pain, and fevers  Ok to use Tylenol, 650 MG Q6 for fever, myalgias  Robitussin for cough suppression  Also recommended adequate hydration

## 2020-08-06 NOTE — PROGRESS NOTES
COVID-19 Virtual Visit     Assessment/Plan:    Problem List Items Addressed This Visit        Other    Exposure to SARS-associated coronavirus - Primary     I have recommended using her mask while in common areas in the household  Frequent hand washing and cleaning of commonly used surfaces has also been recommended by the patient and all family members  ER precautions have been given in the event he develops SOB, chest pain, and fevers  Ok to use Tylenol, 650 MG Q6 for fever, myalgias  Robitussin for cough suppression  Also recommended adequate hydration  Relevant Orders    Novel Coronavirus (COVID-19), PCR [facility] - Collected at [facility]        This virtual check-in was done via [employer] and patient was informed that this is a secure, HIPAA-compliant platform  He agrees to proceed     Disposition:      I referred Nikhil Greene to one of our centralized sites for a COVID-19 swab    I spent 6 minutes directly with the patient during this visit    Encounter provider Kobe Berumen MD    Provider located at [address] [place] [id number] [phone]    Recent Visits  No visits were found meeting these conditions  Showing recent visits within past 7 days and meeting all other requirements     Today's Visits  Date Type Provider Dept   08/06/20 Telemedicine Kobe Berumen MD  [facility]   Showing today's visits and meeting all other requirements     Future Appointments  No visits were found meeting these conditions  Showing future appointments within next 150 days and meeting all other requirements        Patient agrees to participate in a virtual check in via telephone or video visit instead of presenting to the office to address urgent/immediate medical needs  Patient is aware this is a billable service  After connecting through telephone, the patient was identified by name and date of birth   Ivetmelania Walsh was informed that this was a telemedicine visit and that the exam was being conducted confidentially over secure lines  My office door was closed  The following individuals were in the room with me and the patient informed Dr Dave Saunders, Dr Cristiana Rich  Dayron Maier acknowledged consent and understanding of privacy and security of the telemedicine visit  I informed the patient that I have reviewed his record in Epic and presented the opportunity for him to ask any questions regarding the visit today  The patient agreed to participate  Subjective  Dayron Maier is a 49 y o  male who is concerned about COVID-19  He reports fever, chills, headache, anorexia, myalgias and vomiting  He has not experienced shortness of breath He has not had contact with a person who is under investigation for or who is positive for COVID-19 within the last 14 days  He has not been hospitalized recently for fever and/or lower respiratory symptoms      Past Medical History:   Diagnosis Date    Hx of bleeding disorder     hematuria    Lumbar herniated disc        Past Surgical History:   Procedure Laterality Date    FL RETROGRADE PYELOGRAM  9/13/2018    NO PAST SURGERIES      [place] CYSTOSCOPY,DIR VIS INT URETHROTOMY N/A 9/13/2018    Procedure: DIRECT VISUAL INTERAL URETHROTOMY (DVIU),  LASER INCISION;  Surgeon: Kingsley Ayala MD;  [facility];  Service: Urology    [place] CYSTOURETHROSCOPY,URETER CATHETER Bilateral 9/13/2018    Procedure: CYSTOSCOPY WITH RETROGRADE PYELOGRAM;  Surgeon: Kingsley Ayala MD;  [facility];  Service: Urology       Current Outpatient Medications   Medication Sig Dispense Refill    cyclobenzaprine (FLEXERIL) 10 mg tablet Take 10 mg by mouth daily at bedtime  0    finasteride (PROSCAR) 5 mg tablet Take 1 tablet (5 mg total) by mouth daily (Patient not taking: Reported on 3/29/2019) 90 tablet 3    gabapentin (NEURONTIN) 600 MG tablet Take 600 mg by mouth 3 (three) times a day  0    meloxicam (MOBIC) 15 mg tablet take 1 tablet by mouth once daily after meals  0    oxybutynin (DITROPAN) 5 mg tablet Take 1 tablet (5 mg total) by mouth 2 (two) times a day 180 tablet 3    tadalafil (CIALIS) 20 MG tablet Take 0 5 tablets (10 mg total) by mouth daily as needed for erectile dysfunction 5 tablet 3     No current facility-administered medications for this visit  No Known Allergies    Review of Systems   Constitutional: Positive for fatigue and fever  Respiratory: Negative for cough and shortness of breath  Cardiovascular: Negative for chest pain and palpitations  Gastrointestinal: Negative for abdominal pain, nausea and vomiting  Musculoskeletal: Negative for arthralgias, myalgias and neck stiffness  Skin: Negative for rash  Neurological: Negative for dizziness and weakness  It was my intent to perform this visit via video technology but the patient was not able to do a video connection so the visit was completed via audio telephone only  VIRTUAL VISIT DISCLAIMER    Melody Walters acknowledges that he has consented to an online visit or consultation  He understands that the online visit is based solely on information provided by him, and that, in the absence of a face-to-face physical evaluation by the physician, the diagnosis he receives is both limited and provisional in terms of accuracy and completeness  This is not intended to replace a full medical face-to-face evaluation by the physician  Melody Walters understands and accepts these terms

## 2020-08-06 NOTE — LETTER
August 6, 2020     Patient: Dayron Maier   YOB: 1965   Date of Visit: 8/6/2020       To Whom it May Concern:    Dayron Maier is under my professional care  He was seen in my office on 8/6/2020  Due to concerns for COVID-19 infection, he was advised to quarantine at home until he is tested for the virus  If you have any questions or concerns, please don't hesitate to call           Sincerely,          Ana Bah MD        CC: No Recipients

## 2020-08-07 LAB — SARS-COV-2 RNA SPEC QL NAA+PROBE: NOT DETECTED

## 2020-08-20 NOTE — DISCHARGE INSTRUCTIONS
[facility]jia catheter leg bag care Radiation Oncology Consult     Patient here for consult with Dr. Ashby.  History of prostaet cancer s/p biopsy.      Pacemaker:  denies  Prior radiation treatment: denies      Eye Problem(s):negative  ENT Problem(s):negative  Cardiovascular problem(s):aoritic aneurysm  Respiratory problem(s):negative  Gastro-intestinal problem(s):negative GI  Genito-urinary problem(s):negative  Musculoskeletal problem(s):negative  Integumentary problem(s):negative  Neurological problem(s):negative  Psychiatric problem(s):negative  Endocrine problem(s):diabetes  Hematologic and/or Lymphatic problem(s):negative  Pain:      International Prostate Symptom Score (IPSS)    0 Not at all  1 < 1 in 5 times  2 < half the time  3 About half the time  4 More than half the time  5  Almost Always   Your Score   Incomplete Emptying  Over the past month, how often have you had a sensation of not emptying you bladder completely after you finish urinating?      0     Frequency  Over the last month, how often have you had to urinate again less than two hours after you finished urinating?      0     Intermittency  Over the past month, how often have you found you stopped and started again several times when you urinated?      0     Urgency  Over the last month, how difficulty have you found it to postpone urination?      0     Weak Stream  Over the past month, how often have you had a weak urinary stream?      0     Straining  Over the past month, how often have you had to push or strain to begin urination?      0         0 None  1 time  2 times  3 times  4 times  5 times or more Your Score   Nocturia  Over the past month, how many times did you most typically get up to urinate from the time you went to bed until the time you got up in the morning?        1         Total IPSS Score    1       If you were to spend the rest of your life with your urinary condition the way it is now, how would you feel about that?  0 delighted   1 Pleased   2 Mostly  satisfied  3 Mixed equally satisfied & dissatisfied  4 Mostly dissatisfied  5 Unhappy  6 Terrible        Quality of life due to urinary symptoms    1     Urine leakage (incontinence)  0- no leakage  1- Mild ( a few drops a day, no pad use  2- Mild (more than a few drops a day, 1-2 pad/day)  3- Moderate (3 or more pads per day)  4- Severe Leakage Problems       0       Sexual Health Inventory    Each question has several possible responses.  Marquand the number of the response that best describes your own situation.  Please be sure that you select one and only one response for each question.    1- Very Low  2- Low  3- Moderate  4- High  5- Very High    How do you rate your confidence that you could achieve and keep an erection?   4       0- No sexual activity  1- Almost never or never  2- A few times (much less than half the time)  3- Sometimes (about half the time)  4- Most time (much more than half the time)  5- Almost always or always    When you had erections with sexual stimulation, how often were your erections hard enough for penetration (entering your partner)?       5       0- Did not attempt intercourse  1- Almost never or never  2- A few times (much less than half the time)  3- Sometimes (about half the time)  4- Most time (much more than half the time)  5- Almost always or always    During sexual intercourse, how often were you able to maintain your erection after you had penetrated (entered) your partner?   5       0- Did not attempt intercourse  1- Extremely Difficult  2- Very Difficult  3- Difficult  4- Slightly Difficult  5- Not Difficult    During sexual intercourse, how difficult was it to maintain your erection to completion of intercourse?   5       0- Did not attempt intercourse  1- Almost never or never  2- A few times (much less than half the time)  3- Sometimes (about half the time)  4- Most times (much more than half the time)  5- Almost always or always    When you attempted sexual  intercourse, how often was it satisfactory to you?   5       SCORE 24       Bowel Health Inventory    Grade    0- No problems, no rectal bleeding, no discharge, less than 5 bowel movements a day.    1- Mild diarrhea and/or mild cramping and/or bowel movements more than 5 times daily and/or slight rectal discharge or bleeding.      2- Moderate diarrhea and colic and/orbowel movements more than 5 times daily and/or excessive rectal mucus and/or intermittent bleeding    3- obstruction or bleeding, requiring surgery    4- Necrosis/perforation/fistula         0

## 2021-04-28 ENCOUNTER — IMMUNIZATIONS (OUTPATIENT)
Dept: FAMILY MEDICINE CLINIC | Facility: HOSPITAL | Age: 56
End: 2021-04-28

## 2021-04-28 DIAGNOSIS — Z23 ENCOUNTER FOR IMMUNIZATION: Primary | ICD-10-CM

## 2021-04-28 PROCEDURE — 0011A SARS-COV-2 / COVID-19 MRNA VACCINE (MODERNA) 100 MCG: CPT

## 2021-04-28 PROCEDURE — 91301 SARS-COV-2 / COVID-19 MRNA VACCINE (MODERNA) 100 MCG: CPT

## 2021-05-26 ENCOUNTER — IMMUNIZATIONS (OUTPATIENT)
Dept: FAMILY MEDICINE CLINIC | Facility: HOSPITAL | Age: 56
End: 2021-05-26

## 2021-05-26 DIAGNOSIS — Z23 ENCOUNTER FOR IMMUNIZATION: Primary | ICD-10-CM

## 2021-05-26 PROCEDURE — 91301 SARS-COV-2 / COVID-19 MRNA VACCINE (MODERNA) 100 MCG: CPT

## 2021-05-26 PROCEDURE — 0012A SARS-COV-2 / COVID-19 MRNA VACCINE (MODERNA) 100 MCG: CPT

## 2021-12-08 ENCOUNTER — LAB (OUTPATIENT)
Dept: LAB | Facility: HOSPITAL | Age: 56
End: 2021-12-08
Attending: UROLOGY
Payer: COMMERCIAL

## 2021-12-08 ENCOUNTER — OFFICE VISIT (OUTPATIENT)
Dept: UROLOGY | Facility: MEDICAL CENTER | Age: 56
End: 2021-12-08
Payer: COMMERCIAL

## 2021-12-08 VITALS
WEIGHT: 149 LBS | DIASTOLIC BLOOD PRESSURE: 74 MMHG | HEIGHT: 64 IN | BODY MASS INDEX: 25.44 KG/M2 | SYSTOLIC BLOOD PRESSURE: 122 MMHG

## 2021-12-08 DIAGNOSIS — Z12.5 PROSTATE CANCER SCREENING: ICD-10-CM

## 2021-12-08 DIAGNOSIS — N52.8 OTHER MALE ERECTILE DYSFUNCTION: ICD-10-CM

## 2021-12-08 DIAGNOSIS — Z12.5 SPECIAL SCREENING FOR MALIGNANT NEOPLASM OF PROSTATE: Primary | ICD-10-CM

## 2021-12-08 DIAGNOSIS — Z12.5 SPECIAL SCREENING FOR MALIGNANT NEOPLASM OF PROSTATE: ICD-10-CM

## 2021-12-08 DIAGNOSIS — Z71.89 OTHER SPECIFIED COUNSELING: ICD-10-CM

## 2021-12-08 LAB — PSA SERPL-MCNC: 1.6 NG/ML (ref 0–4)

## 2021-12-08 PROCEDURE — 84153 ASSAY OF PSA TOTAL: CPT

## 2021-12-08 PROCEDURE — 3008F BODY MASS INDEX DOCD: CPT | Performed by: UROLOGY

## 2021-12-08 PROCEDURE — 99214 OFFICE O/P EST MOD 30 MIN: CPT | Performed by: UROLOGY

## 2021-12-08 RX ORDER — TADALAFIL 20 MG/1
20 TABLET ORAL AS NEEDED
Qty: 20 TABLET | Refills: 3 | Status: SHIPPED | OUTPATIENT
Start: 2021-12-08 | End: 2022-07-28

## 2022-05-23 ENCOUNTER — OFFICE VISIT (OUTPATIENT)
Dept: FAMILY MEDICINE CLINIC | Facility: CLINIC | Age: 57
End: 2022-05-23

## 2022-05-23 VITALS
TEMPERATURE: 98.5 F | WEIGHT: 146.1 LBS | RESPIRATION RATE: 18 BRPM | OXYGEN SATURATION: 98 % | HEART RATE: 68 BPM | SYSTOLIC BLOOD PRESSURE: 118 MMHG | DIASTOLIC BLOOD PRESSURE: 62 MMHG | BODY MASS INDEX: 24.94 KG/M2 | HEIGHT: 64 IN

## 2022-05-23 DIAGNOSIS — Q82.8: Primary | ICD-10-CM

## 2022-05-23 PROCEDURE — 3008F BODY MASS INDEX DOCD: CPT

## 2022-05-23 PROCEDURE — 99213 OFFICE O/P EST LOW 20 MIN: CPT

## 2022-05-23 PROCEDURE — 3725F SCREEN DEPRESSION PERFORMED: CPT

## 2022-05-23 RX ORDER — CLINDAMYCIN PHOSPHATE 10 UG/ML
LOTION TOPICAL 2 TIMES DAILY
Qty: 60 ML | Refills: 1 | Status: SHIPPED | OUTPATIENT
Start: 2022-05-23

## 2022-05-23 NOTE — ASSESSMENT & PLAN NOTE
- Exam findings characteristic of Paulina-Paulina disease: superficial erosions limited to intertriginous areas without satellite lesions, with history of similar clinical manifestations in other family members (both parents)  Rash initially vesicular, painful, evolving to pruritic hyperpigmented area  - Pt has been prescribed many different treatments over time, including Bactrim and oral steroids (effective), chlorhexidine wash, nystatin, and topical steroids (ineffective)  Per current recommendations, can trial Clindamycin 1% lotion applied to areas 2 to 4 times per day for 2 to 4 weeks  Pt agreeable to plan, aware to call office for change in therapy if no improvement  - Referral to dermatology, likely biopsy for definitive diagnosis

## 2022-05-23 NOTE — PROGRESS NOTES
Assessment/Plan:    Familial benign chronic pemphigus  - Exam findings characteristic of Paulina-Paulina disease: superficial erosions limited to intertriginous areas without satellite lesions, with history of similar clinical manifestations in other family members (both parents)  Rash initially vesicular, painful, evolving to pruritic hyperpigmented area  - Pt has been prescribed many different treatments over time, including Bactrim and oral steroids (effective), chlorhexidine wash, nystatin, and topical steroids (ineffective)  Per current recommendations, can trial Clindamycin 1% lotion applied to areas 2 to 4 times per day for 2 to 4 weeks  Pt agreeable to plan, aware to call office for change in therapy if no improvement  - Referral to dermatology, likely biopsy for definitive diagnosis  Diagnoses and all orders for this visit:    Familial benign chronic pemphigus  -     clindamycin (CLEOCIN T) 1 % lotion; Apply topically 2 (two) times a day Apply to affected areas 2 to 4x per day for 2 to 4 weeks  -     Ambulatory Referral to Dermatology; Future        Subjective:      Patient ID: Nirav Watkins is a 55 y o  male  HPI  Jeanne Herr presented to the office today accompanied by wife Melissa Martínez for 2 week exacerbation of chronic rash  Initial onset around age 28  Last exacerbation was about 3 years ago, at which time he was prescribed Bactrim  Pt states rash appears on same two areas each time, back/right side neck and right side groin  Rash "dries out" between episodes but discoloration does not go away  Rash is painful, starts out with numerous small blisters that drain clear fluid when ruptured, and then rash becomes itchy  Exacerbated by warm temperatures and anything rubbing the area  No close contacts with rash  No recent travel  Parents had same chronic rash and were diagnosed with Miri Garsia in [place], mother was treated with oral steroids  No systemic symptoms  Has never seen dermatology      The following portions of the patient's history were reviewed and updated as appropriate: allergies, current medications, past family history, past medical history, past social history, past surgical history and problem list     Review of Systems   Constitutional: Negative for chills, diaphoresis, fatigue, fever and unexpected weight change  Respiratory: Negative for cough, shortness of breath and wheezing  Cardiovascular: Negative for chest pain and palpitations  Gastrointestinal: Negative for diarrhea, nausea and vomiting  Genitourinary: Negative for difficulty urinating  Skin: Rash: right neck, right inner thigh  Neurological: Negative for dizziness, weakness, numbness and headaches  All other systems reviewed and are negative  Objective:      /62 (BP Location: Right arm, Patient Position: Sitting, Cuff Size: Standard)   Pulse 68   Temp 98 5 °F (36 9 °C) (Temporal)   Resp 18   Ht 5' 4" (1 626 m)   Wt 66 3 kg (146 lb 1 6 oz)   SpO2 98%   BMI 25 08 kg/m²          Physical Exam  Vitals reviewed  Constitutional:       General: He is not in acute distress  Appearance: He is normal weight  He is not ill-appearing  HENT:      Head: Normocephalic and atraumatic  Neck:        Comments: Well-defined erythematous plaque lateral to posterior neck with some crusting and erosions where pt states blisters were present  No satellite lesions  Cardiovascular:      Rate and Rhythm: Normal rate and regular rhythm  Pulmonary:      Effort: Pulmonary effort is normal  No tachypnea  Genitourinary:     Pubic Area: Rash (Well-defined 2-3" erythematous plaque just lateral to testicle on intertriginous area right thigh with unruptured vesicles  No satellite lesions ) present  Skin:     General: Skin is warm and dry  Neurological:      Mental Status: He is alert and oriented to person, place, and time     Psychiatric:         Mood and Affect: Mood and affect normal

## 2022-07-28 ENCOUNTER — TELEPHONE (OUTPATIENT)
Dept: FAMILY MEDICINE CLINIC | Facility: CLINIC | Age: 57
End: 2022-07-28

## 2022-07-28 ENCOUNTER — TELEMEDICINE (OUTPATIENT)
Dept: FAMILY MEDICINE CLINIC | Facility: CLINIC | Age: 57
End: 2022-07-28

## 2022-07-28 DIAGNOSIS — U07.1 COVID-19: Primary | ICD-10-CM

## 2022-07-28 PROCEDURE — 99214 OFFICE O/P EST MOD 30 MIN: CPT | Performed by: NURSE PRACTITIONER

## 2022-07-28 NOTE — TELEPHONE ENCOUNTER
Pt called the nurse line stating that he did a covid test and it came back positive, pt would like to know if possible to either see a doctor or have a medication sent to the pharmacy  Spoke to pt wife she stated that symptoms are worsening pt having body aches, fever, runny nose, pt is currently taking OTC meds Tylenol & motrin

## 2022-07-28 NOTE — PROGRESS NOTES
COVID-19 Outpatient Progress Note    Assessment/Plan:    Problem List Items Addressed This Visit    None     Visit Diagnoses     COVID-19    -  Primary    Relevant Medications    nirmatrelvir & ritonavir (Paxlovid) tablet therapy pack         Disposition:     Patient has COVID-19 infection  Based off CDC guidelines, they were recommended to isolate for 5 days from the date of the positive test  If they remain asymptomatic, isolation may be ended followed by 5 days of wearing a mask when around othes to minimize risk of infecting others  If they have a fever, continue to stay home until fever resolves for at least 24 hours  I have spent 20 minutes directly with the patient  Greater than 50% of this time was spent in counseling/coordination of care regarding: risks and benefits of treatment options, instructions for management and patient and family education  Encounter provider Viral Martínez    Provider located at [address] [id number] [phone]    Recent Visits  No visits were found meeting these conditions  Showing recent visits within past 7 days and meeting all other requirements  Today's Visits  Date Type Provider Dept   07/28/22 Telemedicine ALEJANDRA Martínez [facility]   07/28/22 Telephone [facility] [place] [facility]   Showing today's visits and meeting all other requirements  Future Appointments  No visits were found meeting these conditions  Showing future appointments within next 150 days and meeting all other requirements     This virtual check-in was done via telephone and he agrees to proceed  Patient agrees to participate in a virtual check in via telephone or video visit instead of presenting to the office to address urgent/immediate medical needs  Patient is aware this is a billable service  After connecting through Telephone, the patient was identified by name and date of birth   Brian Bautista [place] Richards was informed that this was a telemedicine visit and that the exam was being conducted confidentially over secure lines  Penny Patient acknowledged consent and understanding of privacy and security of the telemedicine visit  I informed the patient that I have reviewed his record in Epic and presented the opportunity for him to ask any questions regarding the visit today  The patient agreed to participate  Verification of patient location:  Patient is located in the following state in which I hold an active license: [place]    Subjective:   Penny Patient is a 55 y o  male who has been screened for COVID-19  Symptom change since last report: improving  Patient's symptoms include fever, fatigue, nasal congestion, sore throat, cough, myalgias and headache  Patient denies chills, malaise, rhinorrhea, anosmia, loss of taste, shortness of breath, chest tightness, abdominal pain, nausea, vomiting and diarrhea  - Date of symptom onset: 7/26/2022      COVID-19 vaccination status: Fully vaccinated (primary series)    Pepper Guardado has been staying home and has isolated themselves in his home  He is taking care to not share personal items and is cleaning all surfaces that are touched often, like counters, tabletops, and doorknobs using household cleaning sprays or wipes  He is wearing a mask when he leaves his room       Lab Results   Component Value Date    SARSCOV2 Not Detected 08/06/2020     Past Medical History:   Diagnosis Date    Hx of bleeding disorder     hematuria    Lumbar herniated disc      Past Surgical History:   Procedure Laterality Date    FL RETROGRADE PYELOGRAM  9/13/2018    NO PAST SURGERIES      [place] CYSTOSCOPY,DIR VIS INT URETHROTOMY N/A 9/13/2018    Procedure: DIRECT VISUAL INTERAL URETHROTOMY (DVIU),  LASER INCISION;  Surgeon: Camille Morris MD;  [facility];  Service: Urology    NE CYSTOURETHROSCOPY,URETER CATHETER Bilateral 9/13/2018    Procedure: CYSTOSCOPY WITH RETROGRADE PYELOGRAM; Surgeon: Everett Katz MD;  [facility];  Service: Urology     Current Outpatient Medications   Medication Sig Dispense Refill    nirmatrelvir & ritonavir (Paxlovid) tablet therapy pack Take 2 tablets by mouth 2 (two) times a day for 5 days Take 1 nirmatrelvir tablet + 1 ritonavir tablet together per dose 20 tablet 0    clindamycin (CLEOCIN T) 1 % lotion Apply topically 2 (two) times a day Apply to affected areas 2 to 4x per day for 2 to 4 weeks  60 mL 1     No current facility-administered medications for this visit  No Known Allergies    Review of Systems   Constitutional: Positive for fatigue and fever  Negative for chills  HENT: Positive for congestion and sore throat  Negative for rhinorrhea  Respiratory: Positive for cough  Negative for chest tightness and shortness of breath  Gastrointestinal: Negative for abdominal pain, diarrhea, nausea and vomiting  Musculoskeletal: Positive for myalgias  Neurological: Positive for headaches  Objective: There were no vitals filed for this visit  Physical Exam  Pulmonary:      Effort: No respiratory distress  Neurological:      Mental Status: He is alert and oriented to person, place, and time  VIRTUAL VISIT DISCLAIMER    Kati Ordonez verbally agrees to participate in [facility]  Pt is aware that [facility] could be limited without vital signs or the ability to perform a full hands-on physical exam  Abelardo Conti understands he or the provider may request at any time to terminate the video visit and request the patient to seek care or treatment in person

## 2022-10-12 PROBLEM — Z12.5 PROSTATE CANCER SCREENING: Status: RESOLVED | Noted: 2021-12-08 | Resolved: 2022-10-12

## 2022-11-12 ENCOUNTER — APPOINTMENT (EMERGENCY)
Dept: CT IMAGING | Facility: HOSPITAL | Age: 57
End: 2022-11-12

## 2022-11-12 ENCOUNTER — APPOINTMENT (EMERGENCY)
Dept: RADIOLOGY | Facility: HOSPITAL | Age: 57
End: 2022-11-12

## 2022-11-12 ENCOUNTER — HOSPITAL ENCOUNTER (EMERGENCY)
Facility: HOSPITAL | Age: 57
Discharge: HOME | End: 2022-11-12
Attending: EMERGENCY MEDICINE

## 2022-11-12 VITALS
DIASTOLIC BLOOD PRESSURE: 73 MMHG | HEART RATE: 99 BPM | OXYGEN SATURATION: 100 % | WEIGHT: 147.05 LBS | RESPIRATION RATE: 19 BRPM | BODY MASS INDEX: 25.24 KG/M2 | SYSTOLIC BLOOD PRESSURE: 140 MMHG

## 2022-11-12 DIAGNOSIS — S16.1XXA STRAIN OF NECK MUSCLE, INITIAL ENCOUNTER: ICD-10-CM

## 2022-11-12 DIAGNOSIS — S40.012A CONTUSION OF LEFT SHOULDER, INITIAL ENCOUNTER: ICD-10-CM
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
## 2022-11-12 RX ORDER — ACETAMINOPHEN 325 MG/1
975 TABLET ORAL ONCE
Status: COMPLETED | OUTPATIENT
Start: 2022-11-12 | End: 2022-11-12

## 2022-11-12 RX ORDER — LIDOCAINE 50 MG/G
1 PATCH TOPICAL DAILY
Qty: 15 PATCH | Refills: 0 | Status: SHIPPED | OUTPATIENT
Start: 2022-11-12

## 2022-11-12 RX ORDER — CYCLOBENZAPRINE HCL 10 MG
10 TABLET ORAL 2 TIMES DAILY PRN
Qty: 20 TABLET | Refills: 0 | Status: SHIPPED | OUTPATIENT
Start: 2022-11-12

## 2022-11-12 RX ORDER — KETOROLAC TROMETHAMINE 30 MG/ML
30 INJECTION, SOLUTION INTRAMUSCULAR; INTRAVENOUS ONCE
Status: COMPLETED | OUTPATIENT
Start: 2022-11-12 | End: 2022-11-12

## 2022-11-12 RX ORDER — LIDOCAINE 50 MG/G
1 PATCH TOPICAL ONCE
Status: DISCONTINUED | OUTPATIENT
Start: 2022-11-12 | End: 2022-11-12 | Stop reason: HOSPADM

## 2022-11-12 RX ORDER — NAPROXEN 500 MG/1
500 TABLET ORAL 2 TIMES DAILY WITH MEALS
Qty: 30 TABLET | Refills: 0 | Status: SHIPPED | OUTPATIENT
Start: 2022-11-12

## 2022-11-12 RX ADMIN — LIDOCAINE 1 PATCH: 50 PATCH CUTANEOUS at 11:21

## 2022-11-12 RX ADMIN — KETOROLAC TROMETHAMINE 30 MG: 30 INJECTION, SOLUTION INTRAMUSCULAR; INTRAVENOUS at 09:58

## 2022-11-12 RX ADMIN — TETANUS TOXOID, REDUCED DIPHTHERIA TOXOID AND ACELLULAR PERTUSSIS VACCINE, ADSORBED 0.5 ML: 5; 2.5; 8; 8; 2.5 SUSPENSION INTRAMUSCULAR at 09:59

## 2022-11-12 RX ADMIN — ACETAMINOPHEN 975 MG: 325 TABLET ORAL at 09:58

## 2022-11-12 NOTE — Clinical Note
Sesar Villa was seen and treated in our emergency department on 11/12/2022  Diagnosis:     Srinivasan Barbour  may return to work on return date  He may return on this date: 11/14/2022         If you have any questions or concerns, please don't hesitate to call        Amadou Diamond MD    ______________________________           _______________          _______________  Hospital Representative                              Date                                Time
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
## 2022-12-01 ENCOUNTER — HOSPITAL ENCOUNTER (EMERGENCY)
Facility: HOSPITAL | Age: 57
Discharge: HOME | End: 2022-12-01
Attending: INTERNAL MEDICINE

## 2022-12-01 ENCOUNTER — APPOINTMENT (EMERGENCY)
Dept: CT IMAGING | Facility: HOSPITAL | Age: 57
End: 2022-12-01

## 2022-12-01 VITALS
SYSTOLIC BLOOD PRESSURE: 125 MMHG | HEART RATE: 56 BPM | BODY MASS INDEX: 24.28 KG/M2 | DIASTOLIC BLOOD PRESSURE: 74 MMHG | HEIGHT: 64 IN | OXYGEN SATURATION: 98 % | TEMPERATURE: 97.4 F | WEIGHT: 142.2 LBS | RESPIRATION RATE: 12 BRPM

## 2022-12-01 DIAGNOSIS — R51.9 HEADACHE: Primary | ICD-10-CM

## 2022-12-01 DIAGNOSIS — R42 LIGHTHEADEDNESS: ICD-10-CM

## 2022-12-01 LAB
ANION GAP SERPL CALCULATED.3IONS-SCNC: 7 MMOL/L (ref 5–14)
ATRIAL RATE: 64 BPM
BASOPHILS # BLD AUTO: 0.05 THOUSANDS/ÂΜL (ref 0–0.1)
BASOPHILS NFR BLD AUTO: 1 % (ref 0–1)
BUN SERPL-MCNC: 13 MG/DL (ref 5–25)
CALCIUM SERPL-MCNC: 9 MG/DL (ref 8.4–10.2)
CARDIAC TROPONIN I PNL SERPL HS: 5 NG/L
CHLORIDE SERPL-SCNC: 103 MMOL/L (ref 96–108)
CO2 SERPL-SCNC: 29 MMOL/L (ref 21–32)
CREAT SERPL-MCNC: 1.31 MG/DL (ref 0.7–1.5)
EOSINOPHIL # BLD AUTO: 0.15 THOUSAND/ÂΜL (ref 0–0.61)
EOSINOPHIL NFR BLD AUTO: 2 % (ref 0–6)
ERYTHROCYTE [DISTWIDTH] IN BLOOD BY AUTOMATED COUNT: 12.6 % (ref 11.6–15.1)
GFR SERPL CREATININE-BSD FRML MDRD: 60 ML/MIN/1.73SQ M
GLUCOSE SERPL-MCNC: 85 MG/DL (ref 70–99)
GLUCOSE SERPL-MCNC: 86 MG/DL (ref 65–140)
HCT VFR BLD AUTO: 44.7 % (ref 36.5–49.3)
HGB BLD-MCNC: 14.5 G/DL (ref 12–17)
IMM GRANULOCYTES # BLD AUTO: 0.02 THOUSAND/UL (ref 0–0.2)
IMM GRANULOCYTES NFR BLD AUTO: 0 % (ref 0–2)
LYMPHOCYTES # BLD AUTO: 2.39 THOUSANDS/ÂΜL (ref 0.6–4.47)
LYMPHOCYTES NFR BLD AUTO: 26 % (ref 14–44)
MCH RBC QN AUTO: 27.7 PG (ref 26.8–34.3)
MCHC RBC AUTO-ENTMCNC: 32.4 G/DL (ref 31.4–37.4)
MCV RBC AUTO: 85 FL (ref 82–98)
MONOCYTES # BLD AUTO: 0.81 THOUSAND/ÂΜL (ref 0.17–1.22)
MONOCYTES NFR BLD AUTO: 9 % (ref 4–12)
NEUTROPHILS # BLD AUTO: 5.68 THOUSANDS/ÂΜL (ref 1.85–7.62)
NEUTS SEG NFR BLD AUTO: 62 % (ref 43–75)
NRBC BLD AUTO-RTO: 0 /100 WBCS
P AXIS: 72 DEGREES
PLATELET # BLD AUTO: 180 THOUSANDS/UL (ref 149–390)
PMV BLD AUTO: 11.8 FL (ref 8.9–12.7)
POTASSIUM SERPL-SCNC: 4.1 MMOL/L (ref 3.5–5.3)
PR INTERVAL: 208 MS
QRS AXIS: 91 DEGREES
QRSD INTERVAL: 86 MS
QT INTERVAL: 392 MS
QTC INTERVAL: 404 MS
RBC # BLD AUTO: 5.24 MILLION/UL (ref 3.88–5.62)
SODIUM SERPL-SCNC: 139 MMOL/L (ref 135–147)
T WAVE AXIS: 51 DEGREES
VENTRICULAR RATE: 64 BPM
WBC # BLD AUTO: 9.1 THOUSAND/UL (ref 4.31–10.16)

## 2022-12-01 RX ORDER — KETOROLAC TROMETHAMINE 30 MG/ML
15 INJECTION, SOLUTION INTRAMUSCULAR; INTRAVENOUS ONCE
Status: COMPLETED | OUTPATIENT
Start: 2022-12-01 | End: 2022-12-01

## 2022-12-01 RX ORDER — ACETAMINOPHEN 325 MG/1
650 TABLET ORAL ONCE
Status: COMPLETED | OUTPATIENT
Start: 2022-12-01 | End: 2022-12-01

## 2022-12-01 RX ADMIN — ACETAMINOPHEN 650 MG: 325 TABLET ORAL at 11:31

## 2022-12-01 RX ADMIN — KETOROLAC TROMETHAMINE 15 MG: 30 INJECTION, SOLUTION INTRAMUSCULAR; INTRAVENOUS at 12:47

## 2022-12-01 NOTE — Clinical Note
Christiano Smith was seen and treated in our emergency department on 12/1/2022  Diagnosis:     Treva Franco  is off the rest of the shift today  He may return on this date: 12/02/2022         If you have any questions or concerns, please don't hesitate to call        Gonzalez Saunders MD    ______________________________           _______________          _______________  [facility] Representative                              Date                                Time
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
## 2022-12-27 ENCOUNTER — HOSPITAL ENCOUNTER (OUTPATIENT)
Dept: MRI IMAGING | Facility: HOSPITAL | Age: 57
Discharge: HOME | End: 2022-12-27

## 2022-12-27 DIAGNOSIS — G44.301 INTRACTABLE POST-TRAUMATIC HEADACHE, UNSPECIFIED CHRONICITY PATTERN: ICD-10-CM

## 2022-12-27 DIAGNOSIS — M54.50 LOW BACK PAIN, UNSPECIFIED BACK PAIN LATERALITY, UNSPECIFIED CHRONICITY, UNSPECIFIED WHETHER SCIATICA PRESENT: ICD-10-CM

## 2022-12-27 DIAGNOSIS — S00.93XA CONTUSION OF HEAD, UNSPECIFIED PART OF HEAD, INITIAL ENCOUNTER: ICD-10-CM

## 2022-12-27 DIAGNOSIS — M54.2 CERVICALGIA: ICD-10-CM

## 2022-12-28 ENCOUNTER — HOSPITAL ENCOUNTER (OUTPATIENT)
Dept: MRI IMAGING | Facility: HOSPITAL | Age: 57
Discharge: HOME | End: 2022-12-28

## 2022-12-28 DIAGNOSIS — M25.511 RIGHT SHOULDER PAIN, UNSPECIFIED CHRONICITY: ICD-10-CM

## 2022-12-28 DIAGNOSIS — M25.512 LEFT SHOULDER PAIN, UNSPECIFIED CHRONICITY: ICD-10-CM

## (undated) DEVICE — REM POLYHESIVE ADULT PATIENT RETURN ELECTRODE: Brand: VALLEYLAB

## (undated) DEVICE — CATH SECURE FOLEY

## (undated) DEVICE — CATH URETERAL 5FR X 70 CM FLEX TIP POLYUR BARD

## (undated) DEVICE — LIGHT HANDLE COVER SLEEVE DISP BLUE STELLAR

## (undated) DEVICE — Device

## (undated) DEVICE — INVIEW CLEAR LEGGINGS: Brand: CONVERTORS

## (undated) DEVICE — PACK TUR

## (undated) DEVICE — BAG URINE DRAINAGE 2000ML ANTI RFLX LF

## (undated) DEVICE — CATH FOLEY 16FR 5ML 2 WAY UNCOATED SILICONE

## (undated) DEVICE — SCD SEQUENTIAL COMPRESSION COMFORT SLEEVE MEDIUM KNEE LENGTH: Brand: KENDALL SCD

## (undated) DEVICE — TUBING SUCTION 5MM X 12 FT

## (undated) DEVICE — GUIDEWIRE STRGHT TIP 0.035 IN  SOLO PLUS

## (undated) DEVICE — GLOVE SRG BIOGEL 7

## (undated) DEVICE — UROCATCH BAG

## (undated) DEVICE — 60 ML SYRINGE,TOOMEY TYPE: Brand: MONOJECT

## (undated) DEVICE — GLOVE SRG BIOGEL 7.5

## (undated) DEVICE — URETERAL CATHETER 5 FR CONE TIP

## (undated) DEVICE — LUBRICANT SURGILUBE TUBE 4 OZ  FLIP TOP

## (undated) DEVICE — CHEMOTHERAPY CONTAINER,HINGED LID, YELLOW: Brand: SHARPSAFETY